# Patient Record
Sex: MALE | Race: WHITE | Employment: OTHER | URBAN - METROPOLITAN AREA
[De-identification: names, ages, dates, MRNs, and addresses within clinical notes are randomized per-mention and may not be internally consistent; named-entity substitution may affect disease eponyms.]

---

## 2018-12-23 ENCOUNTER — APPOINTMENT (OUTPATIENT)
Dept: GENERAL RADIOLOGY | Age: 73
End: 2018-12-23
Payer: MEDICARE

## 2018-12-23 ENCOUNTER — APPOINTMENT (OUTPATIENT)
Dept: CT IMAGING | Age: 73
End: 2018-12-23
Payer: MEDICARE

## 2018-12-23 ENCOUNTER — HOSPITAL ENCOUNTER (OUTPATIENT)
Age: 73
Setting detail: OBSERVATION
Discharge: HOME | End: 2018-12-24
Attending: EMERGENCY MEDICINE | Admitting: INTERNAL MEDICINE
Payer: MEDICARE

## 2018-12-23 DIAGNOSIS — R41.82 ALTERED MENTAL STATUS, UNSPECIFIED ALTERED MENTAL STATUS TYPE: Primary | ICD-10-CM

## 2018-12-23 DIAGNOSIS — R47.01 APHASIA: ICD-10-CM

## 2018-12-23 PROBLEM — R47.02 DYSPHASIA: Status: ACTIVE | Noted: 2018-12-23

## 2018-12-23 PROBLEM — G45.9 TIA (TRANSIENT ISCHEMIC ATTACK): Status: ACTIVE | Noted: 2018-12-23

## 2018-12-23 LAB
A/G RATIO: 1.7 (ref 1.1–2.2)
ALBUMIN SERPL-MCNC: 4.3 G/DL (ref 3.4–5)
ALP BLD-CCNC: 70 U/L (ref 40–129)
ALT SERPL-CCNC: 16 U/L (ref 10–40)
ANION GAP SERPL CALCULATED.3IONS-SCNC: 10 MMOL/L (ref 3–16)
APTT: 30.7 SEC (ref 26–36)
AST SERPL-CCNC: 13 U/L (ref 15–37)
BASOPHILS ABSOLUTE: 0 K/UL (ref 0–0.2)
BASOPHILS RELATIVE PERCENT: 0.7 %
BILIRUB SERPL-MCNC: 0.4 MG/DL (ref 0–1)
BILIRUBIN URINE: NEGATIVE
BLOOD, URINE: NEGATIVE
BUN BLDV-MCNC: 25 MG/DL (ref 7–20)
CALCIUM SERPL-MCNC: 10.3 MG/DL (ref 8.3–10.6)
CHLORIDE BLD-SCNC: 104 MMOL/L (ref 99–110)
CLARITY: CLEAR
CO2: 29 MMOL/L (ref 21–32)
COLOR: YELLOW
CREAT SERPL-MCNC: 1.3 MG/DL (ref 0.8–1.3)
EOSINOPHILS ABSOLUTE: 0.1 K/UL (ref 0–0.6)
EOSINOPHILS RELATIVE PERCENT: 2 %
EPITHELIAL CELLS, UA: 0 /HPF (ref 0–5)
GFR AFRICAN AMERICAN: >60
GFR NON-AFRICAN AMERICAN: 54
GLOBULIN: 2.5 G/DL
GLUCOSE BLD-MCNC: 129 MG/DL (ref 70–99)
GLUCOSE URINE: NEGATIVE MG/DL
HCT VFR BLD CALC: 41.5 % (ref 40.5–52.5)
HEMOGLOBIN: 13.8 G/DL (ref 13.5–17.5)
HYALINE CASTS: 1 /LPF (ref 0–8)
INR BLD: 1.01 (ref 0.86–1.14)
KETONES, URINE: NEGATIVE MG/DL
LEUKOCYTE ESTERASE, URINE: NEGATIVE
LIPASE: 235 U/L (ref 13–60)
LYMPHOCYTES ABSOLUTE: 1.1 K/UL (ref 1–5.1)
LYMPHOCYTES RELATIVE PERCENT: 15.6 %
MCH RBC QN AUTO: 29.6 PG (ref 26–34)
MCHC RBC AUTO-ENTMCNC: 33.3 G/DL (ref 31–36)
MCV RBC AUTO: 88.9 FL (ref 80–100)
MICROSCOPIC EXAMINATION: YES
MONOCYTES ABSOLUTE: 0.8 K/UL (ref 0–1.3)
MONOCYTES RELATIVE PERCENT: 10.9 %
NEUTROPHILS ABSOLUTE: 5 K/UL (ref 1.7–7.7)
NEUTROPHILS RELATIVE PERCENT: 70.8 %
NITRITE, URINE: NEGATIVE
PDW BLD-RTO: 13.9 % (ref 12.4–15.4)
PH UA: 5.5
PLATELET # BLD: 190 K/UL (ref 135–450)
PMV BLD AUTO: 8.2 FL (ref 5–10.5)
POTASSIUM REFLEX MAGNESIUM: 4.2 MMOL/L (ref 3.5–5.1)
PRO-BNP: 480 PG/ML (ref 0–124)
PROTEIN UA: 100 MG/DL
PROTHROMBIN TIME: 11.5 SEC (ref 9.8–13)
RBC # BLD: 4.67 M/UL (ref 4.2–5.9)
RBC UA: 2 /HPF (ref 0–4)
SODIUM BLD-SCNC: 143 MMOL/L (ref 136–145)
SPECIFIC GRAVITY UA: >1.03
TOTAL PROTEIN: 6.8 G/DL (ref 6.4–8.2)
TROPONIN: <0.01 NG/ML
URINE TYPE: ABNORMAL
UROBILINOGEN, URINE: 0.2 E.U./DL
WBC # BLD: 7.1 K/UL (ref 4–11)
WBC UA: 1 /HPF (ref 0–5)

## 2018-12-23 PROCEDURE — 2580000003 HC RX 258: Performed by: EMERGENCY MEDICINE

## 2018-12-23 PROCEDURE — 96374 THER/PROPH/DIAG INJ IV PUSH: CPT

## 2018-12-23 PROCEDURE — 6360000002 HC RX W HCPCS: Performed by: EMERGENCY MEDICINE

## 2018-12-23 PROCEDURE — 70450 CT HEAD/BRAIN W/O DYE: CPT

## 2018-12-23 PROCEDURE — 70496 CT ANGIOGRAPHY HEAD: CPT

## 2018-12-23 PROCEDURE — 85025 COMPLETE CBC W/AUTO DIFF WBC: CPT

## 2018-12-23 PROCEDURE — 6360000004 HC RX CONTRAST MEDICATION: Performed by: EMERGENCY MEDICINE

## 2018-12-23 PROCEDURE — 85730 THROMBOPLASTIN TIME PARTIAL: CPT

## 2018-12-23 PROCEDURE — 83880 ASSAY OF NATRIURETIC PEPTIDE: CPT

## 2018-12-23 PROCEDURE — 85610 PROTHROMBIN TIME: CPT

## 2018-12-23 PROCEDURE — 6370000000 HC RX 637 (ALT 250 FOR IP): Performed by: EMERGENCY MEDICINE

## 2018-12-23 PROCEDURE — 99285 EMERGENCY DEPT VISIT HI MDM: CPT

## 2018-12-23 PROCEDURE — 81001 URINALYSIS AUTO W/SCOPE: CPT

## 2018-12-23 PROCEDURE — 96375 TX/PRO/DX INJ NEW DRUG ADDON: CPT

## 2018-12-23 PROCEDURE — G0378 HOSPITAL OBSERVATION PER HR: HCPCS

## 2018-12-23 PROCEDURE — 71045 X-RAY EXAM CHEST 1 VIEW: CPT

## 2018-12-23 PROCEDURE — 93005 ELECTROCARDIOGRAM TRACING: CPT | Performed by: EMERGENCY MEDICINE

## 2018-12-23 PROCEDURE — 80053 COMPREHEN METABOLIC PANEL: CPT

## 2018-12-23 PROCEDURE — 70498 CT ANGIOGRAPHY NECK: CPT

## 2018-12-23 PROCEDURE — 83690 ASSAY OF LIPASE: CPT

## 2018-12-23 PROCEDURE — 84484 ASSAY OF TROPONIN QUANT: CPT

## 2018-12-23 RX ORDER — ATORVASTATIN CALCIUM 80 MG/1
40 TABLET, FILM COATED ORAL NIGHTLY
Status: DISCONTINUED | OUTPATIENT
Start: 2018-12-23 | End: 2018-12-24 | Stop reason: HOSPADM

## 2018-12-23 RX ORDER — MORPHINE SULFATE 2 MG/ML
2 INJECTION, SOLUTION INTRAMUSCULAR; INTRAVENOUS
Status: DISCONTINUED | OUTPATIENT
Start: 2018-12-23 | End: 2018-12-24 | Stop reason: HOSPADM

## 2018-12-23 RX ORDER — PROMETHAZINE HYDROCHLORIDE 25 MG/ML
6.25 INJECTION, SOLUTION INTRAMUSCULAR; INTRAVENOUS ONCE
Status: COMPLETED | OUTPATIENT
Start: 2018-12-23 | End: 2018-12-23

## 2018-12-23 RX ORDER — ACETAMINOPHEN 325 MG/1
650 TABLET ORAL EVERY 4 HOURS PRN
Status: DISCONTINUED | OUTPATIENT
Start: 2018-12-23 | End: 2018-12-24 | Stop reason: HOSPADM

## 2018-12-23 RX ORDER — ASPIRIN 81 MG/1
81 TABLET, CHEWABLE ORAL DAILY
Status: DISCONTINUED | OUTPATIENT
Start: 2018-12-24 | End: 2018-12-24 | Stop reason: HOSPADM

## 2018-12-23 RX ORDER — SODIUM CHLORIDE 9 MG/ML
INJECTION, SOLUTION INTRAVENOUS CONTINUOUS
Status: DISCONTINUED | OUTPATIENT
Start: 2018-12-23 | End: 2018-12-24 | Stop reason: HOSPADM

## 2018-12-23 RX ORDER — ASPIRIN 81 MG/1
324 TABLET, CHEWABLE ORAL ONCE
Status: COMPLETED | OUTPATIENT
Start: 2018-12-23 | End: 2018-12-23

## 2018-12-23 RX ORDER — MORPHINE SULFATE 4 MG/ML
4 INJECTION, SOLUTION INTRAMUSCULAR; INTRAVENOUS
Status: DISCONTINUED | OUTPATIENT
Start: 2018-12-23 | End: 2018-12-24 | Stop reason: HOSPADM

## 2018-12-23 RX ORDER — ACETAMINOPHEN 325 MG/1
650 TABLET ORAL EVERY 4 HOURS PRN
Status: DISCONTINUED | OUTPATIENT
Start: 2018-12-23 | End: 2018-12-23 | Stop reason: SDUPTHER

## 2018-12-23 RX ORDER — ONDANSETRON 2 MG/ML
4 INJECTION INTRAMUSCULAR; INTRAVENOUS ONCE
Status: COMPLETED | OUTPATIENT
Start: 2018-12-23 | End: 2018-12-23

## 2018-12-23 RX ADMIN — SODIUM CHLORIDE: 9 INJECTION, SOLUTION INTRAVENOUS at 19:23

## 2018-12-23 RX ADMIN — PROMETHAZINE HYDROCHLORIDE 6.25 MG: 25 INJECTION INTRAMUSCULAR; INTRAVENOUS at 23:00

## 2018-12-23 RX ADMIN — IOPAMIDOL 75 ML: 755 INJECTION, SOLUTION INTRAVENOUS at 19:56

## 2018-12-23 RX ADMIN — ONDANSETRON 4 MG: 2 INJECTION INTRAMUSCULAR; INTRAVENOUS at 21:59

## 2018-12-23 RX ADMIN — ASPIRIN 81 MG 324 MG: 81 TABLET ORAL at 19:23

## 2018-12-23 ASSESSMENT — PAIN DESCRIPTION - ORIENTATION: ORIENTATION: RIGHT

## 2018-12-23 ASSESSMENT — PAIN SCALES - GENERAL: PAINLEVEL_OUTOF10: 9

## 2018-12-23 ASSESSMENT — PAIN DESCRIPTION - PAIN TYPE: TYPE: ACUTE PAIN

## 2018-12-23 ASSESSMENT — PAIN DESCRIPTION - LOCATION: LOCATION: HEAD

## 2018-12-23 NOTE — ED PROVIDER NOTES
alert\" been called? ->Yes  Ordering Physician Provided Reason for Exam: aphasia  Acuity: Acute  Type of Exam: Initial; ORDERING SYSTEM PROVIDED HISTORY: stroke  TECHNOLOGIST PROVIDED HISTORY:  Has a \"code stroke\" or \"stroke alert\" been called? ->Yes  Ordering Physician Provided Reason for Exam: aphasia  Acuity: Acute  Type of Exam: Initial    FINDINGS:    CTA NECK:    AORTIC ARCH/ARCH VESSELS: Vascular calcifications are noted.  Great vessel  origins are patent. CAROTID ARTERIES: Left: Common carotid artery is widely patent.  Bifurcation  and internal carotid artery are patent.  There tortuosity of the distal  cervical segment of the left internal carotid artery. Right: Common carotid artery is widely patent.  Bifurcation is widely patent. Right internal carotid artery is widely patent. Small calcifications are noted bilaterally. VERTEBRAL ARTERIES: Left vertebral artery arises from the arch which is a  normal variant.  Both vertebral arteries are patent without focal significant  narrowing. SOFT TISSUES: Large left submandibular gland calcifications are noted. Bilateral tonsillar calcifications are noted. Multilevel disc bulging in the cervical spine is noted. BONES: Degenerative changes in the cervical spine are noted. CTA HEAD:    ANTERIOR CIRCULATION: Anterior cerebral arteries are widely patent.  Middle  cerebral arteries are patent bilaterally.  There is no focal significant  narrowing.  There is no discrete aneurysm. POSTERIOR CIRCULATION: Calcifications are noted along the margin of the  distal left vertebral artery.  There is no significant associated narrowing. Both distal vertebral arteries and the basilar artery are patent.  There is  no focal narrowing.  Posterior cerebral arteries are widely patent.     BRAIN: Brain parenchymal detail is limited. Barbette Brent is no large area of  abnormal density.  There is no midline shift                    CTA HEAD W CONTRAST (Final result) Result time 12/23/18 20:58:52   Final result by Koby Dumont MD (12/23/18 20:58:52)                Impression:    No focal significant arterial narrowing in the head or the neck. Large left submandibular gland calcifications. Narrative:    EXAMINATION:  CTA OF THE HEAD WITH CONTRAST; CTA OF THE NECK 12/23/2018 7:56 pm; 12/23/2018  7:58 pm:    TECHNIQUE:  CTA of the head/brain was performed with the administration of intravenous  contrast. Multiplanar reformatted images are provided for review.  MIP images  are provided for review. Dose modulation, iterative reconstruction, and/or  weight based adjustment of the mA/kV was utilized to reduce the radiation  dose to as low as reasonably achievable.; CTA of the neck was performed with  the administration of intravenous contrast. Multiplanar reformatted images  are provided for review.  MIP images are provided for review. Stenosis of the  internal carotid arteries measured using NASCET criteria. Dose modulation,  iterative reconstruction, and/or weight based adjustment of the mA/kV was  utilized to reduce the radiation dose to as low as reasonably achievable. COMPARISON:  None. HISTORY:  ORDERING SYSTEM PROVIDED HISTORY: aphasia  TECHNOLOGIST PROVIDED HISTORY:  Has a \"code stroke\" or \"stroke alert\" been called? ->Yes  Ordering Physician Provided Reason for Exam: aphasia  Acuity: Acute  Type of Exam: Initial; ORDERING SYSTEM PROVIDED HISTORY: stroke  TECHNOLOGIST PROVIDED HISTORY:  Has a \"code stroke\" or \"stroke alert\" been called? ->Yes  Ordering Physician Provided Reason for Exam: aphasia  Acuity: Acute  Type of Exam: Initial    FINDINGS:    CTA NECK:    AORTIC ARCH/ARCH VESSELS: Vascular calcifications are noted.  Great vessel  origins are patent.     CAROTID ARTERIES: Left: Common carotid artery is widely patent.  Bifurcation  and internal carotid artery are patent.  There tortuosity of the distal  cervical segment of the left internal carotid type    2. Aphasia          DISPOSITION/PLAN   DISPOSITION Decision To Admit 12/23/2018 09:07:01 PM      PATIENT REFERRED TO:  No follow-up provider specified.     DISCHARGE MEDICATIONS:  New Prescriptions    No medications on file          (Please note that portions of this note were completed with a voice recognition program.  Efforts weremade to edit the dictations but occasionally words are mis-transcribed.)    Danyell Lu III, DO (electronically signed)  Attending Emergency Physician          Mary Boothe III, DO  12/23/18 2113       Mary Boothe III, DO  12/23/18 2133

## 2018-12-24 ENCOUNTER — APPOINTMENT (OUTPATIENT)
Dept: MRI IMAGING | Age: 73
End: 2018-12-24
Payer: MEDICARE

## 2018-12-24 VITALS
HEART RATE: 52 BPM | TEMPERATURE: 98 F | SYSTOLIC BLOOD PRESSURE: 176 MMHG | RESPIRATION RATE: 17 BRPM | WEIGHT: 200.8 LBS | OXYGEN SATURATION: 99 % | DIASTOLIC BLOOD PRESSURE: 103 MMHG | HEIGHT: 71 IN | BODY MASS INDEX: 28.11 KG/M2

## 2018-12-24 PROBLEM — R51.9 HEADACHE: Status: ACTIVE | Noted: 2018-12-24

## 2018-12-24 LAB
CHOLESTEROL, TOTAL: 109 MG/DL (ref 0–199)
EKG ATRIAL RATE: 62 BPM
EKG DIAGNOSIS: NORMAL
EKG P-R INTERVAL: 172 MS
EKG Q-T INTERVAL: 382 MS
EKG QRS DURATION: 104 MS
EKG QTC CALCULATION (BAZETT): 387 MS
EKG R AXIS: -20 DEGREES
EKG T AXIS: 0 DEGREES
EKG VENTRICULAR RATE: 62 BPM
ESTIMATED AVERAGE GLUCOSE: 165.7 MG/DL
GLUCOSE BLD-MCNC: 144 MG/DL (ref 70–99)
GLUCOSE BLD-MCNC: 201 MG/DL (ref 70–99)
HBA1C MFR BLD: 7.4 %
HDLC SERPL-MCNC: 45 MG/DL (ref 40–60)
LDL CHOLESTEROL CALCULATED: 48 MG/DL
LEFT VENTRICULAR EJECTION FRACTION HIGH VALUE: 50 %
LEFT VENTRICULAR EJECTION FRACTION MODE: NORMAL
LV EF: 50 %
LVEF MODALITY: NORMAL
PERFORMED ON: ABNORMAL
PERFORMED ON: ABNORMAL
TRIGL SERPL-MCNC: 82 MG/DL (ref 0–150)
VLDLC SERPL CALC-MCNC: 16 MG/DL

## 2018-12-24 PROCEDURE — 6370000000 HC RX 637 (ALT 250 FOR IP): Performed by: INTERNAL MEDICINE

## 2018-12-24 PROCEDURE — 80061 LIPID PANEL: CPT

## 2018-12-24 PROCEDURE — 70551 MRI BRAIN STEM W/O DYE: CPT

## 2018-12-24 PROCEDURE — 93010 ELECTROCARDIOGRAM REPORT: CPT | Performed by: INTERNAL MEDICINE

## 2018-12-24 PROCEDURE — 83036 HEMOGLOBIN GLYCOSYLATED A1C: CPT

## 2018-12-24 PROCEDURE — 36415 COLL VENOUS BLD VENIPUNCTURE: CPT

## 2018-12-24 PROCEDURE — G8997 SWALLOW GOAL STATUS: HCPCS

## 2018-12-24 PROCEDURE — 6370000000 HC RX 637 (ALT 250 FOR IP): Performed by: FAMILY MEDICINE

## 2018-12-24 PROCEDURE — G8987 SELF CARE CURRENT STATUS: HCPCS

## 2018-12-24 PROCEDURE — G0378 HOSPITAL OBSERVATION PER HR: HCPCS

## 2018-12-24 PROCEDURE — 93306 TTE W/DOPPLER COMPLETE: CPT

## 2018-12-24 PROCEDURE — G8996 SWALLOW CURRENT STATUS: HCPCS

## 2018-12-24 PROCEDURE — G8989 SELF CARE D/C STATUS: HCPCS

## 2018-12-24 PROCEDURE — 97165 OT EVAL LOW COMPLEX 30 MIN: CPT

## 2018-12-24 PROCEDURE — G8988 SELF CARE GOAL STATUS: HCPCS

## 2018-12-24 PROCEDURE — 92610 EVALUATE SWALLOWING FUNCTION: CPT

## 2018-12-24 PROCEDURE — 99220 PR INITIAL OBSERVATION CARE/DAY 70 MINUTES: CPT | Performed by: PSYCHIATRY & NEUROLOGY

## 2018-12-24 RX ORDER — INSULIN GLARGINE 100 [IU]/ML
43 INJECTION, SOLUTION SUBCUTANEOUS NIGHTLY
COMMUNITY

## 2018-12-24 RX ORDER — TACROLIMUS 1 MG/1
1 CAPSULE ORAL 2 TIMES DAILY
Status: DISCONTINUED | OUTPATIENT
Start: 2018-12-24 | End: 2018-12-24 | Stop reason: HOSPADM

## 2018-12-24 RX ORDER — HYDROCHLOROTHIAZIDE 25 MG/1
25 TABLET ORAL DAILY
COMMUNITY
End: 2019-12-23 | Stop reason: ALTCHOICE

## 2018-12-24 RX ORDER — OXYCODONE AND ACETAMINOPHEN 7.5; 325 MG/1; MG/1
1 TABLET ORAL EVERY 4 HOURS PRN
COMMUNITY
End: 2019-12-23 | Stop reason: ALTCHOICE

## 2018-12-24 RX ORDER — CHLORAL HYDRATE 500 MG
1200 CAPSULE ORAL DAILY
COMMUNITY

## 2018-12-24 RX ORDER — MYCOPHENOLATE MOFETIL 250 MG/1
750 CAPSULE ORAL 2 TIMES DAILY
COMMUNITY

## 2018-12-24 RX ORDER — TACROLIMUS 1 MG/1
1 CAPSULE ORAL 2 TIMES DAILY
COMMUNITY
End: 2019-12-23 | Stop reason: ALTCHOICE

## 2018-12-24 RX ORDER — HYDROCHLOROTHIAZIDE 25 MG/1
25 TABLET ORAL DAILY
Status: DISCONTINUED | OUTPATIENT
Start: 2018-12-24 | End: 2018-12-24 | Stop reason: HOSPADM

## 2018-12-24 RX ORDER — MYCOPHENOLATE MOFETIL 250 MG/1
750 CAPSULE ORAL 2 TIMES DAILY
Status: DISCONTINUED | OUTPATIENT
Start: 2018-12-24 | End: 2018-12-24 | Stop reason: HOSPADM

## 2018-12-24 RX ORDER — SIMVASTATIN 20 MG
10 TABLET ORAL NIGHTLY
Status: ON HOLD | COMMUNITY
End: 2019-12-25 | Stop reason: HOSPADM

## 2018-12-24 RX ORDER — ASPIRIN 81 MG/1
81 TABLET, CHEWABLE ORAL DAILY
COMMUNITY

## 2018-12-24 RX ADMIN — ASPIRIN 81 MG 81 MG: 81 TABLET ORAL at 10:56

## 2018-12-24 RX ADMIN — MYCOPHENOLATE MOFETIL 750 MG: 250 CAPSULE ORAL at 09:55

## 2018-12-24 RX ADMIN — HYDROCHLOROTHIAZIDE 25 MG: 25 TABLET ORAL at 13:10

## 2018-12-24 RX ADMIN — MYCOPHENOLATE MOFETIL 750 MG: 250 CAPSULE ORAL at 00:10

## 2018-12-24 RX ADMIN — TACROLIMUS 1 MG: 1 CAPSULE ORAL at 00:10

## 2018-12-24 RX ADMIN — ATORVASTATIN CALCIUM 40 MG: 80 TABLET, FILM COATED ORAL at 03:54

## 2018-12-24 RX ADMIN — TACROLIMUS 1 MG: 1 CAPSULE ORAL at 09:05

## 2018-12-24 ASSESSMENT — PAIN DESCRIPTION - LOCATION: LOCATION: HEAD

## 2018-12-24 ASSESSMENT — PAIN DESCRIPTION - ORIENTATION: ORIENTATION: RIGHT

## 2018-12-24 ASSESSMENT — PAIN SCALES - GENERAL
PAINLEVEL_OUTOF10: 0
PAINLEVEL_OUTOF10: 0
PAINLEVEL_OUTOF10: 2

## 2018-12-24 ASSESSMENT — PAIN DESCRIPTION - PAIN TYPE: TYPE: ACUTE PAIN

## 2018-12-24 NOTE — CONSULTS
In patient Neurology consult        [facility] Neurology      MD Alan Yarbrough  1945    Date of Service: 12/24/2018    Referring Physician: Esau Maddox MD      Reason for the consult: Acute encephalopathy and aphasia    HPI:   The patient is a 68y.o.  years old male with history of hypertension, diabetes, hyperlipidemia and status post renal transplant who was admitted to [facility] yesterday with acute encephalopathy. Symptoms started yesterday. Family describes sudden onset of confusion and difficulties with word findings. He could not remember names of his family or friends. He was not able to follow directions. Degree was severe. Duration was waxing and waning but at least several hours. No triggers. No other associated symptoms. No headache, chest pain, dysphagia, weakness or numbness of falling. Patient's family decided to bring him to the ED. Symptoms improved gradually. He was admitted. Initial CT of the head showed no acute findings. CTA of the head and neck showed no severe ICA stenosis. The patient today feels back to his baseline. MRI of the brain reviewed and showed chronic small vessel disease. He is currently on aspirin and statin. He denies any prior history of strokes. No recent change in medications. Other review of system was unremarkable.           Past Medical History:   Diagnosis Date    Arthritis     Atrial fibrillation ([address]     Cancer ([address]     Diabetes mellitus ([address]     History of blood transfusion     Hyperlipidemia     Hypertension      Family History   Problem Relation Age of Onset    Arthritis Mother     Arrhythmia Mother     Diabetes Mother     High Blood Pressure Mother     Obesity Mother     High Blood Pressure Father     Obesity Father     Allergy (Severe) Child     Asthma Child     Obesity Child      Past Surgical History:   Procedure Laterality Date    EYE SURGERY      JOINT REPLACEMENT       Past Surgical Constitutional:   Vitals:    12/23/18 2345 12/24/18 0330 12/24/18 0700 12/24/18 0805   BP: (!) 177/72 (!) 169/79  (!) 150/76   Pulse: 79 74  63   Resp: 16 16  17   Temp: 98.2 °F (36.8 °C) 98.9 °F (37.2 °C)  98 °F (36.7 °C)   TempSrc: Oral Oral  Axillary   SpO2: 94% 95%  98%   Weight: 197 lb 6.4 oz (89.5 kg)  200 lb 12.8 oz (91.1 kg)    Height:           General appearance:  Normal development and appear in no acute distress. Eye: No icterus. Fundus: No blurring of optic disc. Neck: supple  Cardiovascular: No carotid bruit. No lower leg edema with good pulsation. Mental Status:   Oriented to person, place, problem, and time. Memory: Aware of recent and remote event. Good immediate recall. Intact remote memory  Normal attention span and concentration. Language: intact naming, repeating and fluency   Good fund of Knowledge. Aware of current events and vocabulary   Cranial Nerves:   II: Visual fields: Full to confrontation and nl VA. Pupils: equal, round, reactive to light  III,IV,VI: Extra Ocular Movements are intact. No nystagmus  V: Facial sensation is intact to pin prick and light touch  VII: Facial strength and movements: intact and symmetric  VIII: Hearing: Intact to finger rub bilaterally  IX: Palate elevation is symmetric  XI: Shoulder shrug is intact  XII: Tongue movements are normal  Musculoskeletal: 5/5 in all 4 extremities. Tone: Normal tone. Reflexes: Bilateral biceps 2/4, triceps 2/4, brachial radialis 2/4, knee 1/4 and ankle 1/4. Planters: flexor bilaterally. Coordination: no pronator drift, no dysmetria with FNF and HST in upper and lower extremities. Normal REM. Sensation: normal to all modalities in both arms and legs. Gait/Posture: steady gait and normal posturing and station.      Data:  LABS:   Lab Results   Component Value Date     12/23/2018    K 4.2 12/23/2018     12/23/2018    CO2 29 12/23/2018    BUN 25 12/23/2018    CREATININE 1.3 12/23/2018

## 2018-12-24 NOTE — H&P
does not drink alcohol. Family History:  Reviewed in detail and negative for DM, Early CAD, Cancer, CVA. Positive as follows:    History reviewed. No pertinent family history. REVIEW OF SYSTEMS:   Positive for left-sided headache with associated nausea or vomiting, transient confusion and dysphasia and as noted in the HPI. All other systems reviewed and negative. PHYSICAL EXAM:    BP (!) 190/86   Pulse 73   Temp 97.7 °F (36.5 °C) (Oral)   Resp 11   Ht 5' 11\" (1.803 m)   Wt 204 lb (92.5 kg)   SpO2 96%   BMI 28.45 kg/m²     General appearance: No apparent distress appears stated age and cooperative. HEENT Normal cephalic, atraumatic without obvious deformity. Pupils equal, round, and reactive to light. Extra ocular muscles intact. Conjunctivae/corneas clear. Neck: Supple, No jugular venous distention/bruits. Trachea midline without thyromegaly or adenopathy with full range of motion. Lungs: Clear to auscultation, bilaterally without Rales/Wheezes/Rhonchi with good respiratory effort. Heart: Regular rate and rhythm with Normal S1/S2 without murmurs, rubs or gallops, point of maximum impulse non-displaced  Abdomen: Soft, non-tender or non-distended without rigidity or guarding and positive bowel sounds all four quadrants. Extremities: No clubbing, cyanosis, or edema bilaterally. Full range of motion without deformity and normal gait intact. Skin: Skin color, texture, turgor normal.  No rashes or lesions. Neurologic: Alert and oriented X 3, neurovascularly intact with sensory/motor intact upper extremities/lower extremities, bilaterally. Cranial nerves: II-XII intact, grossly non-focal.  Mental status: Alert, oriented, thought content appropriate.     CXR:  I have reviewed the CXR with the following interpretation: clear lung field  EKG:  I have reviewed the EKG with the following interpretation: Sinus rhythm with first-degree heart block  CT brain: No acute intracranial pathology  CTA head and neck: No acute pathology    CBC   Recent Labs      12/23/18 1859   WBC  7.1   HGB  13.8   HCT  41.5   PLT  190      RENAL  Recent Labs      12/23/18 1859   NA  143   K  4.2   CL  104   CO2  29   BUN  25*   CREATININE  1.3     LFT'S  Recent Labs      12/23/18 1859   AST  13*   ALT  16   BILITOT  0.4   ALKPHOS  70     COAG  Recent Labs      12/23/18 1859   INR  1.01     CARDIAC ENZYMES  Recent Labs      12/23/18 1859   TROPONINI  <0.01       U/A:    Lab Results   Component Value Date    COLORU YELLOW 12/23/2018    WBCUA 1 12/23/2018    RBCUA 2 12/23/2018    CLARITYU Clear 12/23/2018    SPECGRAV >1.030 12/23/2018    LEUKOCYTESUR Negative 12/23/2018    BLOODU Negative 12/23/2018    GLUCOSEU Negative 12/23/2018           Active Hospital Problems    Diagnosis Date Noted    Headache [R51] 12/24/2018    TIA (transient ischemic attack) [G45.9] 12/23/2018    Dysphasia [R47.02] 12/23/2018         ASSESSMENT/PLAN:  80-year-old gentleman with history of Renal transplant on anti-rejection medication, probable atrial fibrillation  though patient denies prior diagnosis who was admitted following transient neurological symptoms of word finding difficulty with associated left frontal headache concerning for probable TIA. Differentials include subclinical seizure. Plan:  -Get MRI scan of the brain  -Echocardiogram  -PT OT and speech pathology consult  -Neurology consult  -Continue aspirin and will add Lipitor  -Check A1c and lipid panel      DVT Prophylaxis: SQ enoxaparin  Diet:  Regular  Code Status: Full  PT/OT Eval Status: get evals    Dispo - anticipate discharge in next 24-48 hours if tests unremarkable       Janell Callaway MD    Thank you No primary care provider on file. for the opportunity to be involved in this patient's care. If you have any questions or concerns please feel free to contact me at [phone]. A total of evaluation in the ER the patient was asymptomatic except for the headache.

## 2018-12-24 NOTE — PLAN OF CARE
Problem: Cardiovascular  Goal: No DVT, peripheral vascular complications  periph pulses present; extremities warm to touch; cap refill brisk; negative Homans

## 2018-12-24 NOTE — PLAN OF CARE
Problem: Cardiovascular  Goal: Hemodynamic stability  BP mildly elevated - AM meds yet to be administered

## 2019-12-23 ENCOUNTER — APPOINTMENT (OUTPATIENT)
Dept: GENERAL RADIOLOGY | Age: 74
DRG: 062 | End: 2019-12-23
Payer: OTHER GOVERNMENT

## 2019-12-23 ENCOUNTER — APPOINTMENT (OUTPATIENT)
Dept: CT IMAGING | Age: 74
DRG: 062 | End: 2019-12-23
Payer: OTHER GOVERNMENT

## 2019-12-23 ENCOUNTER — HOSPITAL ENCOUNTER (INPATIENT)
Age: 74
LOS: 2 days | Discharge: HOME | DRG: 062 | End: 2019-12-25
Attending: EMERGENCY MEDICINE | Admitting: INTERNAL MEDICINE
Payer: OTHER GOVERNMENT

## 2019-12-23 DIAGNOSIS — R47.01 APHASIA: ICD-10-CM

## 2019-12-23 DIAGNOSIS — I10 MALIGNANT HYPERTENSION: ICD-10-CM

## 2019-12-23 DIAGNOSIS — I63.9 CEREBROVASCULAR ACCIDENT (CVA), UNSPECIFIED MECHANISM (HCC): Primary | ICD-10-CM

## 2019-12-23 LAB
A/G RATIO: 1 (ref 1.1–2.2)
ALBUMIN SERPL-MCNC: 2.8 G/DL (ref 3.4–5)
ALP BLD-CCNC: 74 U/L (ref 40–129)
ALT SERPL-CCNC: 16 U/L (ref 10–40)
ANION GAP SERPL CALCULATED.3IONS-SCNC: 11 MMOL/L (ref 3–16)
AST SERPL-CCNC: 15 U/L (ref 15–37)
BASOPHILS ABSOLUTE: 0.1 K/UL (ref 0–0.2)
BASOPHILS RELATIVE PERCENT: 1 %
BILIRUB SERPL-MCNC: <0.2 MG/DL (ref 0–1)
BILIRUBIN URINE: ABNORMAL
BLOOD, URINE: ABNORMAL
BUN BLDV-MCNC: 17 MG/DL (ref 7–20)
CALCIUM SERPL-MCNC: 8.6 MG/DL (ref 8.3–10.6)
CASTS: ABNORMAL /LPF
CHLORIDE BLD-SCNC: 103 MMOL/L (ref 99–110)
CLARITY: ABNORMAL
CO2: 25 MMOL/L (ref 21–32)
COLOR: YELLOW
COMMENT UA: ABNORMAL
CREAT SERPL-MCNC: 1.5 MG/DL (ref 0.8–1.3)
EOSINOPHILS ABSOLUTE: 0.1 K/UL (ref 0–0.6)
EOSINOPHILS RELATIVE PERCENT: 0.9 %
EPITHELIAL CELLS, UA: 5 /HPF (ref 0–5)
GFR AFRICAN AMERICAN: 55
GFR NON-AFRICAN AMERICAN: 46
GLOBULIN: 2.9 G/DL
GLUCOSE BLD-MCNC: 186 MG/DL (ref 70–99)
GLUCOSE BLD-MCNC: 192 MG/DL (ref 70–99)
GLUCOSE BLD-MCNC: 224 MG/DL (ref 70–99)
GLUCOSE URINE: 250 MG/DL
HCT VFR BLD CALC: 40.6 % (ref 40.5–52.5)
HEMOGLOBIN: 13.5 G/DL (ref 13.5–17.5)
HYALINE CASTS: 23 /LPF (ref 0–8)
INR BLD: 0.97 (ref 0.86–1.14)
KETONES, URINE: NEGATIVE MG/DL
LEUKOCYTE ESTERASE, URINE: NEGATIVE
LYMPHOCYTES ABSOLUTE: 0.8 K/UL (ref 1–5.1)
LYMPHOCYTES RELATIVE PERCENT: 10.4 %
MCH RBC QN AUTO: 28.3 PG (ref 26–34)
MCHC RBC AUTO-ENTMCNC: 33.3 G/DL (ref 31–36)
MCV RBC AUTO: 85 FL (ref 80–100)
MICROSCOPIC EXAMINATION: YES
MONOCYTES ABSOLUTE: 0.7 K/UL (ref 0–1.3)
MONOCYTES RELATIVE PERCENT: 9.6 %
NEUTROPHILS ABSOLUTE: 5.8 K/UL (ref 1.7–7.7)
NEUTROPHILS RELATIVE PERCENT: 78.1 %
NITRITE, URINE: NEGATIVE
PDW BLD-RTO: 15.2 % (ref 12.4–15.4)
PERFORMED ON: ABNORMAL
PERFORMED ON: ABNORMAL
PH UA: 6 (ref 5–8)
PLATELET # BLD: 207 K/UL (ref 135–450)
PMV BLD AUTO: 7.9 FL (ref 5–10.5)
POTASSIUM REFLEX MAGNESIUM: 3.6 MMOL/L (ref 3.5–5.1)
PRO-BNP: 1253 PG/ML (ref 0–124)
PROTEIN UA: >300 MG/DL
PROTHROMBIN TIME: 11.2 SEC (ref 10–13.2)
RBC # BLD: 4.78 M/UL (ref 4.2–5.9)
RBC UA: 22 /HPF (ref 0–4)
SODIUM BLD-SCNC: 139 MMOL/L (ref 136–145)
SPECIFIC GRAVITY UA: >1.03 (ref 1–1.03)
TOTAL PROTEIN: 5.7 G/DL (ref 6.4–8.2)
TROPONIN: 0.02 NG/ML
URINE REFLEX TO CULTURE: ABNORMAL
URINE TYPE: ABNORMAL
UROBILINOGEN, URINE: 0.2 E.U./DL
WBC # BLD: 7.5 K/UL (ref 4–11)
WBC UA: 4 /HPF (ref 0–5)

## 2019-12-23 PROCEDURE — 80053 COMPREHEN METABOLIC PANEL: CPT

## 2019-12-23 PROCEDURE — 99291 CRITICAL CARE FIRST HOUR: CPT

## 2019-12-23 PROCEDURE — 70450 CT HEAD/BRAIN W/O DYE: CPT

## 2019-12-23 PROCEDURE — 2580000003 HC RX 258: Performed by: EMERGENCY MEDICINE

## 2019-12-23 PROCEDURE — 6370000000 HC RX 637 (ALT 250 FOR IP): Performed by: INTERNAL MEDICINE

## 2019-12-23 PROCEDURE — 85610 PROTHROMBIN TIME: CPT

## 2019-12-23 PROCEDURE — 84484 ASSAY OF TROPONIN QUANT: CPT

## 2019-12-23 PROCEDURE — 99292 CRITICAL CARE ADDL 30 MIN: CPT

## 2019-12-23 PROCEDURE — 96376 TX/PRO/DX INJ SAME DRUG ADON: CPT

## 2019-12-23 PROCEDURE — 2500000003 HC RX 250 WO HCPCS: Performed by: EMERGENCY MEDICINE

## 2019-12-23 PROCEDURE — 71045 X-RAY EXAM CHEST 1 VIEW: CPT

## 2019-12-23 PROCEDURE — 6360000002 HC RX W HCPCS: Performed by: INTERNAL MEDICINE

## 2019-12-23 PROCEDURE — 96374 THER/PROPH/DIAG INJ IV PUSH: CPT

## 2019-12-23 PROCEDURE — 93005 ELECTROCARDIOGRAM TRACING: CPT | Performed by: EMERGENCY MEDICINE

## 2019-12-23 PROCEDURE — 3E03317 INTRODUCTION OF OTHER THROMBOLYTIC INTO PERIPHERAL VEIN, PERCUTANEOUS APPROACH: ICD-10-PCS | Performed by: INTERNAL MEDICINE

## 2019-12-23 PROCEDURE — 2000000000 HC ICU R&B

## 2019-12-23 PROCEDURE — 96375 TX/PRO/DX INJ NEW DRUG ADDON: CPT

## 2019-12-23 PROCEDURE — 93005 ELECTROCARDIOGRAM TRACING: CPT

## 2019-12-23 PROCEDURE — 36415 COLL VENOUS BLD VENIPUNCTURE: CPT

## 2019-12-23 PROCEDURE — 96361 HYDRATE IV INFUSION ADD-ON: CPT

## 2019-12-23 PROCEDURE — 81001 URINALYSIS AUTO W/SCOPE: CPT

## 2019-12-23 PROCEDURE — 2580000003 HC RX 258: Performed by: INTERNAL MEDICINE

## 2019-12-23 PROCEDURE — 83880 ASSAY OF NATRIURETIC PEPTIDE: CPT

## 2019-12-23 PROCEDURE — 6360000002 HC RX W HCPCS: Performed by: EMERGENCY MEDICINE

## 2019-12-23 PROCEDURE — 85025 COMPLETE CBC W/AUTO DIFF WBC: CPT

## 2019-12-23 RX ORDER — LOSARTAN POTASSIUM 100 MG/1
100 TABLET ORAL DAILY
COMMUNITY

## 2019-12-23 RX ORDER — LABETALOL HYDROCHLORIDE 5 MG/ML
10 INJECTION, SOLUTION INTRAVENOUS ONCE
Status: COMPLETED | OUTPATIENT
Start: 2019-12-23 | End: 2019-12-23

## 2019-12-23 RX ORDER — SIROLIMUS 1 MG/1
1 TABLET, FILM COATED ORAL DAILY
Status: DISCONTINUED | OUTPATIENT
Start: 2019-12-24 | End: 2019-12-23 | Stop reason: ALTCHOICE

## 2019-12-23 RX ORDER — NICOTINE POLACRILEX 4 MG
15 LOZENGE BUCCAL PRN
Status: DISCONTINUED | OUTPATIENT
Start: 2019-12-23 | End: 2019-12-25 | Stop reason: HOSPADM

## 2019-12-23 RX ORDER — MYCOPHENOLATE MOFETIL 250 MG/1
750 CAPSULE ORAL 2 TIMES DAILY
Status: DISCONTINUED | OUTPATIENT
Start: 2019-12-23 | End: 2019-12-24

## 2019-12-23 RX ORDER — TACROLIMUS 0.5 MG/1
0.5 CAPSULE ORAL 2 TIMES DAILY
Status: DISCONTINUED | OUTPATIENT
Start: 2019-12-23 | End: 2019-12-24

## 2019-12-23 RX ORDER — SODIUM CHLORIDE 0.9 % (FLUSH) 0.9 %
10 SYRINGE (ML) INJECTION EVERY 12 HOURS SCHEDULED
Status: DISCONTINUED | OUTPATIENT
Start: 2019-12-23 | End: 2019-12-25 | Stop reason: HOSPADM

## 2019-12-23 RX ORDER — 0.9 % SODIUM CHLORIDE 0.9 %
50 INTRAVENOUS SOLUTION INTRAVENOUS ONCE
Status: COMPLETED | OUTPATIENT
Start: 2019-12-23 | End: 2019-12-23

## 2019-12-23 RX ORDER — ATORVASTATIN CALCIUM 40 MG/1
40 TABLET, FILM COATED ORAL NIGHTLY
Status: DISCONTINUED | OUTPATIENT
Start: 2019-12-23 | End: 2019-12-25 | Stop reason: HOSPADM

## 2019-12-23 RX ORDER — SODIUM CHLORIDE 0.9 % (FLUSH) 0.9 %
10 SYRINGE (ML) INJECTION PRN
Status: DISCONTINUED | OUTPATIENT
Start: 2019-12-23 | End: 2019-12-25 | Stop reason: HOSPADM

## 2019-12-23 RX ORDER — SODIUM CHLORIDE 9 MG/ML
INJECTION, SOLUTION INTRAVENOUS
Status: DISPENSED
Start: 2019-12-23 | End: 2019-12-24

## 2019-12-23 RX ORDER — TACROLIMUS 0.5 MG/1
0.5 CAPSULE ORAL 2 TIMES DAILY
Status: DISCONTINUED | OUTPATIENT
Start: 2019-12-23 | End: 2019-12-23 | Stop reason: ALTCHOICE

## 2019-12-23 RX ORDER — INSULIN LISPRO 100 [IU]/ML
0-3 INJECTION, SOLUTION INTRAVENOUS; SUBCUTANEOUS NIGHTLY
Status: DISCONTINUED | OUTPATIENT
Start: 2019-12-23 | End: 2019-12-25 | Stop reason: HOSPADM

## 2019-12-23 RX ORDER — LEVOTHYROXINE SODIUM ANHYDROUS 100 UG/5ML
50 INJECTION, POWDER, LYOPHILIZED, FOR SOLUTION INTRAVENOUS DAILY
Status: ON HOLD | COMMUNITY
End: 2020-01-23

## 2019-12-23 RX ORDER — ACETAMINOPHEN 325 MG/1
650 TABLET ORAL EVERY 4 HOURS PRN
Status: DISCONTINUED | OUTPATIENT
Start: 2019-12-23 | End: 2019-12-25 | Stop reason: HOSPADM

## 2019-12-23 RX ORDER — DEXTROSE MONOHYDRATE 25 G/50ML
12.5 INJECTION, SOLUTION INTRAVENOUS
Status: ACTIVE | OUTPATIENT
Start: 2019-12-23 | End: 2019-12-23

## 2019-12-23 RX ORDER — AMLODIPINE BESYLATE 10 MG/1
5 TABLET ORAL DAILY
COMMUNITY

## 2019-12-23 RX ORDER — DEXTROSE MONOHYDRATE 50 MG/ML
100 INJECTION, SOLUTION INTRAVENOUS PRN
Status: DISCONTINUED | OUTPATIENT
Start: 2019-12-23 | End: 2019-12-25 | Stop reason: HOSPADM

## 2019-12-23 RX ORDER — DEXTROSE MONOHYDRATE 25 G/50ML
12.5 INJECTION, SOLUTION INTRAVENOUS PRN
Status: DISCONTINUED | OUTPATIENT
Start: 2019-12-23 | End: 2019-12-25 | Stop reason: HOSPADM

## 2019-12-23 RX ORDER — INSULIN LISPRO 100 [IU]/ML
0-6 INJECTION, SOLUTION INTRAVENOUS; SUBCUTANEOUS
Status: DISCONTINUED | OUTPATIENT
Start: 2019-12-23 | End: 2019-12-25 | Stop reason: HOSPADM

## 2019-12-23 RX ORDER — SIROLIMUS 1 MG/1
1 TABLET, FILM COATED ORAL DAILY
COMMUNITY

## 2019-12-23 RX ORDER — INSULIN LISPRO 100 [IU]/ML
5 INJECTION, SOLUTION INTRAVENOUS; SUBCUTANEOUS
Status: DISCONTINUED | OUTPATIENT
Start: 2019-12-23 | End: 2019-12-25 | Stop reason: HOSPADM

## 2019-12-23 RX ORDER — ONDANSETRON 2 MG/ML
4 INJECTION INTRAMUSCULAR; INTRAVENOUS EVERY 6 HOURS PRN
Status: DISCONTINUED | OUTPATIENT
Start: 2019-12-23 | End: 2019-12-25 | Stop reason: HOSPADM

## 2019-12-23 RX ADMIN — INSULIN LISPRO 1 UNITS: 100 INJECTION, SOLUTION INTRAVENOUS; SUBCUTANEOUS at 21:49

## 2019-12-23 RX ADMIN — TACROLIMUS 0.5 MG: 0.5 CAPSULE ORAL at 22:07

## 2019-12-23 RX ADMIN — SODIUM CHLORIDE 5.5 MG/HR: 9 INJECTION, SOLUTION INTRAVENOUS at 23:35

## 2019-12-23 RX ADMIN — ALTEPLASE 8.2 MG: KIT at 16:35

## 2019-12-23 RX ADMIN — Medication 10 ML: at 22:12

## 2019-12-23 RX ADMIN — DESMOPRESSIN ACETATE 40 MG: 0.2 TABLET ORAL at 22:07

## 2019-12-23 RX ADMIN — MYCOPHENOLATE MOFETIL 750 MG: 250 CAPSULE ORAL at 22:07

## 2019-12-23 RX ADMIN — INSULIN GLARGINE 25 UNITS: 100 INJECTION, SOLUTION SUBCUTANEOUS at 21:48

## 2019-12-23 RX ADMIN — SODIUM CHLORIDE 50 ML: 9 INJECTION, SOLUTION INTRAVENOUS at 17:26

## 2019-12-23 RX ADMIN — SODIUM CHLORIDE 8.5 MG/HR: 9 INJECTION, SOLUTION INTRAVENOUS at 20:54

## 2019-12-23 RX ADMIN — SODIUM CHLORIDE 5 MG/HR: 9 INJECTION, SOLUTION INTRAVENOUS at 17:43

## 2019-12-23 RX ADMIN — LABETALOL HYDROCHLORIDE 10 MG: 5 INJECTION INTRAVENOUS at 16:31

## 2019-12-23 RX ADMIN — ALTEPLASE 73.5 MG: KIT at 16:37

## 2019-12-24 ENCOUNTER — APPOINTMENT (OUTPATIENT)
Dept: MRI IMAGING | Age: 74
DRG: 062 | End: 2019-12-24
Payer: OTHER GOVERNMENT

## 2019-12-24 ENCOUNTER — APPOINTMENT (OUTPATIENT)
Dept: CT IMAGING | Age: 74
DRG: 062 | End: 2019-12-24
Payer: OTHER GOVERNMENT

## 2019-12-24 LAB
ANION GAP SERPL CALCULATED.3IONS-SCNC: 11 MMOL/L (ref 3–16)
BUN BLDV-MCNC: 16 MG/DL (ref 7–20)
CALCIUM SERPL-MCNC: 8.2 MG/DL (ref 8.3–10.6)
CHLORIDE BLD-SCNC: 103 MMOL/L (ref 99–110)
CHOLESTEROL, TOTAL: 172 MG/DL (ref 0–199)
CO2: 24 MMOL/L (ref 21–32)
CREAT SERPL-MCNC: 1.5 MG/DL (ref 0.8–1.3)
EKG ATRIAL RATE: 66 BPM
EKG ATRIAL RATE: 72 BPM
EKG ATRIAL RATE: 82 BPM
EKG DIAGNOSIS: NORMAL
EKG P AXIS: 17 DEGREES
EKG P AXIS: 89 DEGREES
EKG P-R INTERVAL: 198 MS
EKG P-R INTERVAL: 202 MS
EKG P-R INTERVAL: 208 MS
EKG Q-T INTERVAL: 382 MS
EKG Q-T INTERVAL: 398 MS
EKG Q-T INTERVAL: 422 MS
EKG QRS DURATION: 108 MS
EKG QRS DURATION: 110 MS
EKG QRS DURATION: 114 MS
EKG QTC CALCULATION (BAZETT): 417 MS
EKG QTC CALCULATION (BAZETT): 446 MS
EKG QTC CALCULATION (BAZETT): 462 MS
EKG R AXIS: -23 DEGREES
EKG R AXIS: -24 DEGREES
EKG R AXIS: -29 DEGREES
EKG T AXIS: -41 DEGREES
EKG T AXIS: 11 DEGREES
EKG T AXIS: 30 DEGREES
EKG VENTRICULAR RATE: 66 BPM
EKG VENTRICULAR RATE: 72 BPM
EKG VENTRICULAR RATE: 82 BPM
GFR AFRICAN AMERICAN: 55
GFR NON-AFRICAN AMERICAN: 46
GLUCOSE BLD-MCNC: 110 MG/DL (ref 70–99)
GLUCOSE BLD-MCNC: 129 MG/DL (ref 70–99)
GLUCOSE BLD-MCNC: 141 MG/DL (ref 70–99)
GLUCOSE BLD-MCNC: 161 MG/DL (ref 70–99)
GLUCOSE BLD-MCNC: 168 MG/DL (ref 70–99)
HCT VFR BLD CALC: 39.4 % (ref 40.5–52.5)
HDLC SERPL-MCNC: 47 MG/DL (ref 40–60)
HEMOGLOBIN: 13.1 G/DL (ref 13.5–17.5)
LDL CHOLESTEROL CALCULATED: 82 MG/DL
LEFT VENTRICULAR EJECTION FRACTION HIGH VALUE: 65 %
LEFT VENTRICULAR EJECTION FRACTION MODE: NORMAL
LV EF: 60 %
LV EF: 63 %
LVEF MODALITY: NORMAL
MCH RBC QN AUTO: 28.1 PG (ref 26–34)
MCHC RBC AUTO-ENTMCNC: 33.2 G/DL (ref 31–36)
MCV RBC AUTO: 84.6 FL (ref 80–100)
PDW BLD-RTO: 14.8 % (ref 12.4–15.4)
PERFORMED ON: ABNORMAL
PLATELET # BLD: 215 K/UL (ref 135–450)
PMV BLD AUTO: 8 FL (ref 5–10.5)
POTASSIUM REFLEX MAGNESIUM: 3.6 MMOL/L (ref 3.5–5.1)
RBC # BLD: 4.66 M/UL (ref 4.2–5.9)
SODIUM BLD-SCNC: 138 MMOL/L (ref 136–145)
TRIGL SERPL-MCNC: 216 MG/DL (ref 0–150)
VLDLC SERPL CALC-MCNC: 43 MG/DL
WBC # BLD: 9.4 K/UL (ref 4–11)

## 2019-12-24 PROCEDURE — 70450 CT HEAD/BRAIN W/O DYE: CPT

## 2019-12-24 PROCEDURE — 6360000002 HC RX W HCPCS: Performed by: INTERNAL MEDICINE

## 2019-12-24 PROCEDURE — 6370000000 HC RX 637 (ALT 250 FOR IP): Performed by: INTERNAL MEDICINE

## 2019-12-24 PROCEDURE — 85027 COMPLETE CBC AUTOMATED: CPT

## 2019-12-24 PROCEDURE — 92610 EVALUATE SWALLOWING FUNCTION: CPT

## 2019-12-24 PROCEDURE — 2500000003 HC RX 250 WO HCPCS: Performed by: EMERGENCY MEDICINE

## 2019-12-24 PROCEDURE — 80048 BASIC METABOLIC PNL TOTAL CA: CPT

## 2019-12-24 PROCEDURE — 99223 1ST HOSP IP/OBS HIGH 75: CPT | Performed by: PSYCHIATRY & NEUROLOGY

## 2019-12-24 PROCEDURE — 2580000003 HC RX 258: Performed by: EMERGENCY MEDICINE

## 2019-12-24 PROCEDURE — 93010 ELECTROCARDIOGRAM REPORT: CPT | Performed by: INTERNAL MEDICINE

## 2019-12-24 PROCEDURE — 93306 TTE W/DOPPLER COMPLETE: CPT

## 2019-12-24 PROCEDURE — 70551 MRI BRAIN STEM W/O DYE: CPT

## 2019-12-24 PROCEDURE — 2580000003 HC RX 258: Performed by: INTERNAL MEDICINE

## 2019-12-24 PROCEDURE — 93880 EXTRACRANIAL BILAT STUDY: CPT

## 2019-12-24 PROCEDURE — 2000000000 HC ICU R&B

## 2019-12-24 PROCEDURE — 80061 LIPID PANEL: CPT

## 2019-12-24 PROCEDURE — 92526 ORAL FUNCTION THERAPY: CPT

## 2019-12-24 PROCEDURE — 6370000000 HC RX 637 (ALT 250 FOR IP): Performed by: HOSPITALIST

## 2019-12-24 PROCEDURE — 99222 1ST HOSP IP/OBS MODERATE 55: CPT | Performed by: INTERNAL MEDICINE

## 2019-12-24 PROCEDURE — 93005 ELECTROCARDIOGRAM TRACING: CPT | Performed by: INTERNAL MEDICINE

## 2019-12-24 PROCEDURE — 92523 SPEECH SOUND LANG COMPREHEN: CPT

## 2019-12-24 RX ORDER — LABETALOL HYDROCHLORIDE 5 MG/ML
10 INJECTION, SOLUTION INTRAVENOUS EVERY 6 HOURS PRN
Status: DISCONTINUED | OUTPATIENT
Start: 2019-12-24 | End: 2019-12-25 | Stop reason: HOSPADM

## 2019-12-24 RX ORDER — ASPIRIN 81 MG/1
81 TABLET ORAL DAILY
Status: DISCONTINUED | OUTPATIENT
Start: 2019-12-24 | End: 2019-12-25 | Stop reason: HOSPADM

## 2019-12-24 RX ORDER — TRAMADOL HYDROCHLORIDE 50 MG/1
100 TABLET ORAL EVERY 6 HOURS PRN
Status: DISCONTINUED | OUTPATIENT
Start: 2019-12-24 | End: 2019-12-25 | Stop reason: HOSPADM

## 2019-12-24 RX ORDER — TACROLIMUS 1 MG/1
1 CAPSULE ORAL 2 TIMES DAILY
Status: DISCONTINUED | OUTPATIENT
Start: 2019-12-24 | End: 2019-12-25 | Stop reason: HOSPADM

## 2019-12-24 RX ORDER — AMLODIPINE BESYLATE 5 MG/1
5 TABLET ORAL DAILY
Status: DISCONTINUED | OUTPATIENT
Start: 2019-12-24 | End: 2019-12-25 | Stop reason: HOSPADM

## 2019-12-24 RX ORDER — PROMETHAZINE HYDROCHLORIDE 25 MG/ML
12.5 INJECTION, SOLUTION INTRAMUSCULAR; INTRAVENOUS EVERY 6 HOURS PRN
Status: DISCONTINUED | OUTPATIENT
Start: 2019-12-24 | End: 2019-12-25 | Stop reason: HOSPADM

## 2019-12-24 RX ORDER — TRAMADOL HYDROCHLORIDE 50 MG/1
50 TABLET ORAL EVERY 6 HOURS PRN
Status: DISCONTINUED | OUTPATIENT
Start: 2019-12-24 | End: 2019-12-25 | Stop reason: HOSPADM

## 2019-12-24 RX ORDER — HYDRALAZINE HYDROCHLORIDE 20 MG/ML
10 INJECTION INTRAMUSCULAR; INTRAVENOUS EVERY 6 HOURS PRN
Status: DISCONTINUED | OUTPATIENT
Start: 2019-12-24 | End: 2019-12-25 | Stop reason: HOSPADM

## 2019-12-24 RX ORDER — MYCOPHENOLATE MOFETIL 250 MG/1
750 CAPSULE ORAL 2 TIMES DAILY
Status: DISCONTINUED | OUTPATIENT
Start: 2019-12-24 | End: 2019-12-25 | Stop reason: HOSPADM

## 2019-12-24 RX ADMIN — INSULIN GLARGINE 25 UNITS: 100 INJECTION, SOLUTION SUBCUTANEOUS at 21:14

## 2019-12-24 RX ADMIN — Medication 10 ML: at 21:11

## 2019-12-24 RX ADMIN — TACROLIMUS 1 MG: 1 CAPSULE ORAL at 21:11

## 2019-12-24 RX ADMIN — ONDANSETRON 4 MG: 2 INJECTION INTRAMUSCULAR; INTRAVENOUS at 02:42

## 2019-12-24 RX ADMIN — INSULIN LISPRO 1 UNITS: 100 INJECTION, SOLUTION INTRAVENOUS; SUBCUTANEOUS at 12:52

## 2019-12-24 RX ADMIN — INSULIN LISPRO 5 UNITS: 100 INJECTION, SOLUTION INTRAVENOUS; SUBCUTANEOUS at 12:53

## 2019-12-24 RX ADMIN — TACROLIMUS 1 MG: 1 CAPSULE ORAL at 09:36

## 2019-12-24 RX ADMIN — Medication 10 ML: at 13:03

## 2019-12-24 RX ADMIN — DESMOPRESSIN ACETATE 40 MG: 0.2 TABLET ORAL at 21:11

## 2019-12-24 RX ADMIN — MYCOPHENOLATE MOFETIL 750 MG: 250 CAPSULE ORAL at 21:11

## 2019-12-24 RX ADMIN — SODIUM CHLORIDE 5 MG/HR: 9 INJECTION, SOLUTION INTRAVENOUS at 07:30

## 2019-12-24 RX ADMIN — ASPIRIN 81 MG: 81 TABLET, COATED ORAL at 17:27

## 2019-12-24 RX ADMIN — MYCOPHENOLATE MOFETIL 750 MG: 250 CAPSULE ORAL at 09:36

## 2019-12-24 RX ADMIN — AMLODIPINE BESYLATE 5 MG: 5 TABLET ORAL at 17:27

## 2019-12-24 RX ADMIN — INSULIN LISPRO 5 UNITS: 100 INJECTION, SOLUTION INTRAVENOUS; SUBCUTANEOUS at 17:40

## 2019-12-25 VITALS
WEIGHT: 198.19 LBS | RESPIRATION RATE: 16 BRPM | HEART RATE: 61 BPM | HEIGHT: 72 IN | BODY MASS INDEX: 26.84 KG/M2 | DIASTOLIC BLOOD PRESSURE: 76 MMHG | SYSTOLIC BLOOD PRESSURE: 158 MMHG | OXYGEN SATURATION: 100 % | TEMPERATURE: 97.3 F

## 2019-12-25 LAB
GLUCOSE BLD-MCNC: 75 MG/DL (ref 70–99)
PERFORMED ON: NORMAL

## 2019-12-25 PROCEDURE — 2580000003 HC RX 258: Performed by: EMERGENCY MEDICINE

## 2019-12-25 PROCEDURE — 99233 SBSQ HOSP IP/OBS HIGH 50: CPT | Performed by: PSYCHIATRY & NEUROLOGY

## 2019-12-25 PROCEDURE — 2580000003 HC RX 258: Performed by: INTERNAL MEDICINE

## 2019-12-25 PROCEDURE — 97530 THERAPEUTIC ACTIVITIES: CPT

## 2019-12-25 PROCEDURE — 97165 OT EVAL LOW COMPLEX 30 MIN: CPT

## 2019-12-25 PROCEDURE — 6370000000 HC RX 637 (ALT 250 FOR IP): Performed by: INTERNAL MEDICINE

## 2019-12-25 PROCEDURE — 6370000000 HC RX 637 (ALT 250 FOR IP): Performed by: HOSPITALIST

## 2019-12-25 PROCEDURE — 97161 PT EVAL LOW COMPLEX 20 MIN: CPT

## 2019-12-25 RX ORDER — ATORVASTATIN CALCIUM 40 MG/1
40 TABLET, FILM COATED ORAL NIGHTLY
Qty: 30 TABLET | Refills: 3 | Status: ON HOLD | OUTPATIENT
Start: 2019-12-25 | End: 2020-01-23

## 2019-12-25 RX ORDER — CLOPIDOGREL BISULFATE 75 MG/1
75 TABLET ORAL DAILY
Qty: 30 TABLET | Refills: 3 | Status: SHIPPED | OUTPATIENT
Start: 2019-12-25

## 2019-12-25 RX ORDER — CLOPIDOGREL BISULFATE 75 MG/1
75 TABLET ORAL DAILY
Status: DISCONTINUED | OUTPATIENT
Start: 2019-12-25 | End: 2019-12-25 | Stop reason: HOSPADM

## 2019-12-25 RX ORDER — LOSARTAN POTASSIUM 100 MG/1
100 TABLET ORAL DAILY
Status: DISCONTINUED | OUTPATIENT
Start: 2019-12-25 | End: 2019-12-25 | Stop reason: HOSPADM

## 2019-12-25 RX ADMIN — TACROLIMUS 1 MG: 1 CAPSULE ORAL at 09:51

## 2019-12-25 RX ADMIN — LOSARTAN POTASSIUM 100 MG: 100 TABLET, FILM COATED ORAL at 10:07

## 2019-12-25 RX ADMIN — ASPIRIN 81 MG: 81 TABLET, COATED ORAL at 09:49

## 2019-12-25 RX ADMIN — CLOPIDOGREL 75 MG: 75 TABLET, FILM COATED ORAL at 10:07

## 2019-12-25 RX ADMIN — MYCOPHENOLATE MOFETIL 750 MG: 250 CAPSULE ORAL at 09:52

## 2019-12-25 RX ADMIN — Medication 10 ML: at 10:11

## 2019-12-25 RX ADMIN — INSULIN LISPRO 5 UNITS: 100 INJECTION, SOLUTION INTRAVENOUS; SUBCUTANEOUS at 09:58

## 2019-12-25 RX ADMIN — Medication 10 ML: at 10:12

## 2019-12-25 RX ADMIN — AMLODIPINE BESYLATE 5 MG: 5 TABLET ORAL at 09:49

## 2020-01-23 ENCOUNTER — APPOINTMENT (OUTPATIENT)
Dept: MRI IMAGING | Age: 75
DRG: 683 | End: 2020-01-23
Payer: OTHER GOVERNMENT

## 2020-01-23 ENCOUNTER — HOSPITAL ENCOUNTER (INPATIENT)
Age: 75
LOS: 1 days | Discharge: HOME | DRG: 683 | End: 2020-01-27
Attending: EMERGENCY MEDICINE | Admitting: HOSPITALIST
Payer: OTHER GOVERNMENT

## 2020-01-23 ENCOUNTER — APPOINTMENT (OUTPATIENT)
Dept: CT IMAGING | Age: 75
DRG: 683 | End: 2020-01-23
Payer: OTHER GOVERNMENT

## 2020-01-23 ENCOUNTER — APPOINTMENT (OUTPATIENT)
Dept: GENERAL RADIOLOGY | Age: 75
DRG: 683 | End: 2020-01-23
Payer: OTHER GOVERNMENT

## 2020-01-23 LAB
A/G RATIO: 1.3 (ref 1.1–2.2)
ALBUMIN SERPL-MCNC: 3.4 G/DL (ref 3.4–5)
ALP BLD-CCNC: 72 U/L (ref 40–129)
ALT SERPL-CCNC: 12 U/L (ref 10–40)
ANION GAP SERPL CALCULATED.3IONS-SCNC: 11 MMOL/L (ref 3–16)
AST SERPL-CCNC: 11 U/L (ref 15–37)
BASOPHILS ABSOLUTE: 0.1 K/UL (ref 0–0.2)
BASOPHILS RELATIVE PERCENT: 1.1 %
BILIRUB SERPL-MCNC: 0.7 MG/DL (ref 0–1)
BILIRUBIN URINE: NEGATIVE
BLOOD, URINE: ABNORMAL
BUN BLDV-MCNC: 20 MG/DL (ref 7–20)
CALCIUM SERPL-MCNC: 9.4 MG/DL (ref 8.3–10.6)
CHLORIDE BLD-SCNC: 98 MMOL/L (ref 99–110)
CLARITY: CLEAR
CO2: 27 MMOL/L (ref 21–32)
COLOR: YELLOW
CREAT SERPL-MCNC: 1.4 MG/DL (ref 0.8–1.3)
EOSINOPHILS ABSOLUTE: 0 K/UL (ref 0–0.6)
EOSINOPHILS RELATIVE PERCENT: 0.6 %
EPITHELIAL CELLS, UA: 2 /HPF (ref 0–5)
GFR AFRICAN AMERICAN: 60
GFR NON-AFRICAN AMERICAN: 50
GLOBULIN: 2.7 G/DL
GLUCOSE BLD-MCNC: 222 MG/DL (ref 70–99)
GLUCOSE BLD-MCNC: 243 MG/DL (ref 70–99)
GLUCOSE BLD-MCNC: 250 MG/DL (ref 70–99)
GLUCOSE URINE: 250 MG/DL
HCT VFR BLD CALC: 42.3 % (ref 40.5–52.5)
HEMOGLOBIN: 14.2 G/DL (ref 13.5–17.5)
HYALINE CASTS: 9 /LPF (ref 0–8)
INR BLD: 0.97 (ref 0.86–1.14)
KETONES, URINE: ABNORMAL MG/DL
LEUKOCYTE ESTERASE, URINE: NEGATIVE
LYMPHOCYTES ABSOLUTE: 0.8 K/UL (ref 1–5.1)
LYMPHOCYTES RELATIVE PERCENT: 10.4 %
MCH RBC QN AUTO: 28.7 PG (ref 26–34)
MCHC RBC AUTO-ENTMCNC: 33.5 G/DL (ref 31–36)
MCV RBC AUTO: 85.9 FL (ref 80–100)
MICROSCOPIC EXAMINATION: YES
MONOCYTES ABSOLUTE: 0.5 K/UL (ref 0–1.3)
MONOCYTES RELATIVE PERCENT: 6.6 %
NEUTROPHILS ABSOLUTE: 5.9 K/UL (ref 1.7–7.7)
NEUTROPHILS RELATIVE PERCENT: 81.3 %
NITRITE, URINE: NEGATIVE
PDW BLD-RTO: 15.8 % (ref 12.4–15.4)
PERFORMED ON: ABNORMAL
PERFORMED ON: ABNORMAL
PH UA: 7.5 (ref 5–8)
PLATELET # BLD: 196 K/UL (ref 135–450)
PMV BLD AUTO: 8.4 FL (ref 5–10.5)
POTASSIUM REFLEX MAGNESIUM: 4.3 MMOL/L (ref 3.5–5.1)
PROTEIN UA: >300 MG/DL
PROTHROMBIN TIME: 11.3 SEC (ref 10–13.2)
RBC # BLD: 4.93 M/UL (ref 4.2–5.9)
RBC UA: 15 /HPF (ref 0–4)
SODIUM BLD-SCNC: 136 MMOL/L (ref 136–145)
SPECIFIC GRAVITY UA: 1.02 (ref 1–1.03)
TOTAL PROTEIN: 6.1 G/DL (ref 6.4–8.2)
TROPONIN: 0.02 NG/ML
URINE REFLEX TO CULTURE: ABNORMAL
URINE TYPE: ABNORMAL
UROBILINOGEN, URINE: 0.2 E.U./DL
WBC # BLD: 7.3 K/UL (ref 4–11)
WBC UA: 2 /HPF (ref 0–5)

## 2020-01-23 PROCEDURE — 85025 COMPLETE CBC W/AUTO DIFF WBC: CPT

## 2020-01-23 PROCEDURE — 80053 COMPREHEN METABOLIC PANEL: CPT

## 2020-01-23 PROCEDURE — 96375 TX/PRO/DX INJ NEW DRUG ADDON: CPT

## 2020-01-23 PROCEDURE — 2580000003 HC RX 258: Performed by: HOSPITALIST

## 2020-01-23 PROCEDURE — 84484 ASSAY OF TROPONIN QUANT: CPT

## 2020-01-23 PROCEDURE — 70551 MRI BRAIN STEM W/O DYE: CPT

## 2020-01-23 PROCEDURE — 81001 URINALYSIS AUTO W/SCOPE: CPT

## 2020-01-23 PROCEDURE — 6360000002 HC RX W HCPCS: Performed by: HOSPITALIST

## 2020-01-23 PROCEDURE — 70450 CT HEAD/BRAIN W/O DYE: CPT

## 2020-01-23 PROCEDURE — 6370000000 HC RX 637 (ALT 250 FOR IP): Performed by: HOSPITALIST

## 2020-01-23 PROCEDURE — 6360000002 HC RX W HCPCS: Performed by: EMERGENCY MEDICINE

## 2020-01-23 PROCEDURE — 36415 COLL VENOUS BLD VENIPUNCTURE: CPT

## 2020-01-23 PROCEDURE — 96376 TX/PRO/DX INJ SAME DRUG ADON: CPT

## 2020-01-23 PROCEDURE — 85610 PROTHROMBIN TIME: CPT

## 2020-01-23 PROCEDURE — 94760 N-INVAS EAR/PLS OXIMETRY 1: CPT

## 2020-01-23 PROCEDURE — 96374 THER/PROPH/DIAG INJ IV PUSH: CPT

## 2020-01-23 PROCEDURE — G0378 HOSPITAL OBSERVATION PER HR: HCPCS

## 2020-01-23 PROCEDURE — 99291 CRITICAL CARE FIRST HOUR: CPT

## 2020-01-23 PROCEDURE — 93005 ELECTROCARDIOGRAM TRACING: CPT | Performed by: EMERGENCY MEDICINE

## 2020-01-23 PROCEDURE — 6360000002 HC RX W HCPCS

## 2020-01-23 PROCEDURE — C9113 INJ PANTOPRAZOLE SODIUM, VIA: HCPCS | Performed by: HOSPITALIST

## 2020-01-23 PROCEDURE — 71045 X-RAY EXAM CHEST 1 VIEW: CPT

## 2020-01-23 RX ORDER — INSULIN LISPRO 100 [IU]/ML
0-3 INJECTION, SOLUTION INTRAVENOUS; SUBCUTANEOUS NIGHTLY
Status: DISCONTINUED | OUTPATIENT
Start: 2020-01-23 | End: 2020-01-27 | Stop reason: HOSPADM

## 2020-01-23 RX ORDER — MYCOPHENOLATE MOFETIL 250 MG/1
750 CAPSULE ORAL 2 TIMES DAILY
Status: DISCONTINUED | OUTPATIENT
Start: 2020-01-23 | End: 2020-01-27 | Stop reason: HOSPADM

## 2020-01-23 RX ORDER — DEXTROSE MONOHYDRATE 25 G/50ML
12.5 INJECTION, SOLUTION INTRAVENOUS PRN
Status: DISCONTINUED | OUTPATIENT
Start: 2020-01-23 | End: 2020-01-27 | Stop reason: HOSPADM

## 2020-01-23 RX ORDER — LORAZEPAM 2 MG/ML
1 INJECTION INTRAMUSCULAR ONCE
Status: COMPLETED | OUTPATIENT
Start: 2020-01-23 | End: 2020-01-23

## 2020-01-23 RX ORDER — AMLODIPINE BESYLATE 5 MG/1
5 TABLET ORAL DAILY
Status: DISCONTINUED | OUTPATIENT
Start: 2020-01-23 | End: 2020-01-24

## 2020-01-23 RX ORDER — ATORVASTATIN CALCIUM 40 MG/1
40 TABLET, FILM COATED ORAL NIGHTLY
Status: DISCONTINUED | OUTPATIENT
Start: 2020-01-23 | End: 2020-01-27 | Stop reason: HOSPADM

## 2020-01-23 RX ORDER — MYCOPHENOLATE MOFETIL 250 MG/1
750 CAPSULE ORAL 2 TIMES DAILY
Status: DISCONTINUED | OUTPATIENT
Start: 2020-01-23 | End: 2020-01-23 | Stop reason: DRUGHIGH

## 2020-01-23 RX ORDER — LABETALOL HYDROCHLORIDE 5 MG/ML
10 INJECTION, SOLUTION INTRAVENOUS EVERY 6 HOURS PRN
Status: DISCONTINUED | OUTPATIENT
Start: 2020-01-23 | End: 2020-01-26

## 2020-01-23 RX ORDER — CLOPIDOGREL BISULFATE 75 MG/1
75 TABLET ORAL DAILY
Status: DISCONTINUED | OUTPATIENT
Start: 2020-01-24 | End: 2020-01-27 | Stop reason: HOSPADM

## 2020-01-23 RX ORDER — NICOTINE POLACRILEX 4 MG
15 LOZENGE BUCCAL PRN
Status: DISCONTINUED | OUTPATIENT
Start: 2020-01-23 | End: 2020-01-27 | Stop reason: HOSPADM

## 2020-01-23 RX ORDER — INSULIN LISPRO 100 [IU]/ML
0-6 INJECTION, SOLUTION INTRAVENOUS; SUBCUTANEOUS
Status: DISCONTINUED | OUTPATIENT
Start: 2020-01-24 | End: 2020-01-27 | Stop reason: HOSPADM

## 2020-01-23 RX ORDER — ONDANSETRON 2 MG/ML
4 INJECTION INTRAMUSCULAR; INTRAVENOUS EVERY 6 HOURS PRN
Status: DISCONTINUED | OUTPATIENT
Start: 2020-01-23 | End: 2020-01-27 | Stop reason: HOSPADM

## 2020-01-23 RX ORDER — PROCHLORPERAZINE EDISYLATE 5 MG/ML
10 INJECTION INTRAMUSCULAR; INTRAVENOUS EVERY 6 HOURS PRN
Status: DISCONTINUED | OUTPATIENT
Start: 2020-01-23 | End: 2020-01-27 | Stop reason: HOSPADM

## 2020-01-23 RX ORDER — HYDRALAZINE HYDROCHLORIDE 20 MG/ML
10 INJECTION INTRAMUSCULAR; INTRAVENOUS EVERY 6 HOURS PRN
Status: DISCONTINUED | OUTPATIENT
Start: 2020-01-23 | End: 2020-01-26

## 2020-01-23 RX ORDER — ASPIRIN 81 MG/1
81 TABLET ORAL DAILY
Status: DISCONTINUED | OUTPATIENT
Start: 2020-01-23 | End: 2020-01-23 | Stop reason: ALTCHOICE

## 2020-01-23 RX ORDER — ONDANSETRON 2 MG/ML
INJECTION INTRAMUSCULAR; INTRAVENOUS
Status: COMPLETED
Start: 2020-01-23 | End: 2020-01-23

## 2020-01-23 RX ORDER — ASPIRIN 300 MG/1
300 SUPPOSITORY RECTAL DAILY
Status: DISCONTINUED | OUTPATIENT
Start: 2020-01-24 | End: 2020-01-27 | Stop reason: HOSPADM

## 2020-01-23 RX ORDER — SODIUM CHLORIDE 0.9 % (FLUSH) 0.9 %
10 SYRINGE (ML) INJECTION EVERY 12 HOURS SCHEDULED
Status: DISCONTINUED | OUTPATIENT
Start: 2020-01-23 | End: 2020-01-27 | Stop reason: HOSPADM

## 2020-01-23 RX ORDER — SODIUM CHLORIDE 0.9 % (FLUSH) 0.9 %
10 SYRINGE (ML) INJECTION PRN
Status: DISCONTINUED | OUTPATIENT
Start: 2020-01-23 | End: 2020-01-27 | Stop reason: HOSPADM

## 2020-01-23 RX ORDER — ONDANSETRON 2 MG/ML
INJECTION INTRAMUSCULAR; INTRAVENOUS
Status: DISPENSED
Start: 2020-01-23 | End: 2020-01-24

## 2020-01-23 RX ORDER — SIMVASTATIN 40 MG
40 TABLET ORAL NIGHTLY
COMMUNITY

## 2020-01-23 RX ORDER — LOSARTAN POTASSIUM 25 MG/1
100 TABLET ORAL DAILY
Status: CANCELLED | OUTPATIENT
Start: 2020-01-23

## 2020-01-23 RX ORDER — TACROLIMUS 1 MG/1
1 CAPSULE ORAL 2 TIMES DAILY
Status: DISCONTINUED | OUTPATIENT
Start: 2020-01-23 | End: 2020-01-25

## 2020-01-23 RX ORDER — ASPIRIN 81 MG/1
81 TABLET, CHEWABLE ORAL DAILY
Status: DISCONTINUED | OUTPATIENT
Start: 2020-01-24 | End: 2020-01-27 | Stop reason: HOSPADM

## 2020-01-23 RX ORDER — INSULIN LISPRO 100 [IU]/ML
5 INJECTION, SOLUTION INTRAVENOUS; SUBCUTANEOUS
Status: DISCONTINUED | OUTPATIENT
Start: 2020-01-24 | End: 2020-01-27 | Stop reason: HOSPADM

## 2020-01-23 RX ORDER — ONDANSETRON 2 MG/ML
4 INJECTION INTRAMUSCULAR; INTRAVENOUS ONCE
Status: COMPLETED | OUTPATIENT
Start: 2020-01-23 | End: 2020-01-23

## 2020-01-23 RX ORDER — LEVOTHYROXINE SODIUM 0.03 MG/1
25 TABLET ORAL DAILY
COMMUNITY

## 2020-01-23 RX ORDER — PANTOPRAZOLE SODIUM 40 MG/10ML
40 INJECTION, POWDER, LYOPHILIZED, FOR SOLUTION INTRAVENOUS DAILY
Status: DISCONTINUED | OUTPATIENT
Start: 2020-01-23 | End: 2020-01-27 | Stop reason: HOSPADM

## 2020-01-23 RX ORDER — ATORVASTATIN CALCIUM 80 MG/1
80 TABLET, FILM COATED ORAL NIGHTLY
Status: DISCONTINUED | OUTPATIENT
Start: 2020-01-23 | End: 2020-01-23 | Stop reason: ALTCHOICE

## 2020-01-23 RX ORDER — TACROLIMUS 1 MG/1
2 CAPSULE ORAL 2 TIMES DAILY
COMMUNITY

## 2020-01-23 RX ORDER — DEXTROSE MONOHYDRATE 50 MG/ML
100 INJECTION, SOLUTION INTRAVENOUS PRN
Status: DISCONTINUED | OUTPATIENT
Start: 2020-01-23 | End: 2020-01-27 | Stop reason: HOSPADM

## 2020-01-23 RX ORDER — HYDRALAZINE HYDROCHLORIDE 20 MG/ML
5 INJECTION INTRAMUSCULAR; INTRAVENOUS ONCE
Status: COMPLETED | OUTPATIENT
Start: 2020-01-23 | End: 2020-01-23

## 2020-01-23 RX ADMIN — DESMOPRESSIN ACETATE 40 MG: 0.2 TABLET ORAL at 21:39

## 2020-01-23 RX ADMIN — Medication 10 ML: at 21:40

## 2020-01-23 RX ADMIN — INSULIN LISPRO 2 UNITS: 100 INJECTION, SOLUTION INTRAVENOUS; SUBCUTANEOUS at 23:25

## 2020-01-23 RX ADMIN — ONDANSETRON 4 MG: 2 INJECTION INTRAMUSCULAR; INTRAVENOUS at 19:23

## 2020-01-23 RX ADMIN — HYDRALAZINE HYDROCHLORIDE 5 MG: 20 INJECTION INTRAMUSCULAR; INTRAVENOUS at 16:28

## 2020-01-23 RX ADMIN — MYCOPHENOLATE MOFETIL 750 MG: 250 CAPSULE ORAL at 21:34

## 2020-01-23 RX ADMIN — AMLODIPINE BESYLATE 5 MG: 5 TABLET ORAL at 21:39

## 2020-01-23 RX ADMIN — TACROLIMUS 1 MG: 1 CAPSULE ORAL at 21:35

## 2020-01-23 RX ADMIN — ONDANSETRON 4 MG: 2 INJECTION INTRAMUSCULAR; INTRAVENOUS at 14:26

## 2020-01-23 RX ADMIN — LORAZEPAM 1 MG: 2 INJECTION INTRAMUSCULAR; INTRAVENOUS at 16:10

## 2020-01-23 RX ADMIN — PANTOPRAZOLE SODIUM 40 MG: 40 INJECTION, POWDER, LYOPHILIZED, FOR SOLUTION INTRAVENOUS at 21:39

## 2020-01-23 RX ADMIN — Medication 10 ML: at 21:39

## 2020-01-23 RX ADMIN — INSULIN GLARGINE 43 UNITS: 100 INJECTION, SOLUTION SUBCUTANEOUS at 23:27

## 2020-01-23 RX ADMIN — HYDRALAZINE HYDROCHLORIDE 10 MG: 20 INJECTION INTRAMUSCULAR; INTRAVENOUS at 23:43

## 2020-01-23 NOTE — ED NOTES
Pt placed on continuous pulse oximetry and telemetry monitoring. Pt placed on cycling blood pressure. Fall risk precautions in place, call light in reach, bed side table within reach, bed alarm on, will continue to monitor.          Shivam Ley RN  01/23/20 3196

## 2020-01-23 NOTE — ED NOTES
Bed: 01  Expected date:   Expected time:   Means of arrival:   Comments:  Emergency Use     Melinda Galindo RN  01/23/20 0150

## 2020-01-23 NOTE — ED PROVIDER NOTES
changes appreciated. No evidence of acute ischemia. Radiology  CT HEAD WO CONTRAST   Final Result   No hemorrhage or mass identified      Atrophy and small-vessel ischemic change, similar to prior. Mild paranasal sinus disease      Results discussed with EZEQUIEL SIMS by Jennifer Smith.  Susan Hernandez MD at 2:24 pm on   1/23/2020         XR CHEST PORTABLE    (Results Pending)   CTA NECK W CONTRAST    (Results Pending)   CTA HEAD W CONTRAST    (Results Pending)       Labs  Results for orders placed or performed during the hospital encounter of 01/23/20   CBC Auto Differential   Result Value Ref Range    WBC 7.3 4.0 - 11.0 K/uL    RBC 4.93 4.20 - 5.90 M/uL    Hemoglobin 14.2 13.5 - 17.5 g/dL    Hematocrit 42.3 40.5 - 52.5 %    MCV 85.9 80.0 - 100.0 fL    MCH 28.7 26.0 - 34.0 pg    MCHC 33.5 31.0 - 36.0 g/dL    RDW 15.8 (H) 12.4 - 15.4 %    Platelets 132 998 - 767 K/uL    MPV 8.4 5.0 - 10.5 fL    Neutrophils % 81.3 %    Lymphocytes % 10.4 %    Monocytes % 6.6 %    Eosinophils % 0.6 %    Basophils % 1.1 %    Neutrophils Absolute 5.9 1.7 - 7.7 K/uL    Lymphocytes Absolute 0.8 (L) 1.0 - 5.1 K/uL    Monocytes Absolute 0.5 0.0 - 1.3 K/uL    Eosinophils Absolute 0.0 0.0 - 0.6 K/uL    Basophils Absolute 0.1 0.0 - 0.2 K/uL   Comprehensive Metabolic Panel w/ Reflex to MG   Result Value Ref Range    Sodium 136 136 - 145 mmol/L    Potassium reflex Magnesium 4.3 3.5 - 5.1 mmol/L    Chloride 98 (L) 99 - 110 mmol/L    CO2 27 21 - 32 mmol/L    Anion Gap 11 3 - 16    Glucose 243 (H) 70 - 99 mg/dL    BUN 20 7 - 20 mg/dL    CREATININE 1.4 (H) 0.8 - 1.3 mg/dL    GFR Non-African American 50 (A) >60    GFR  60 (A) >60    Calcium 9.4 8.3 - 10.6 mg/dL    Total Protein 6.1 (L) 6.4 - 8.2 g/dL    Alb 3.4 3.4 - 5.0 g/dL    Albumin/Globulin Ratio 1.3 1.1 - 2.2    Total Bilirubin 0.7 0.0 - 1.0 mg/dL    Alkaline Phosphatase 72 40 - 129 U/L    ALT 12 10 - 40 U/L    AST 11 (L) 15 - 37 U/L    Globulin 2.7 g/dL   Troponin   Result Value while evaluating and treating this patient was at least 45 minutes. This excludes time spent doing separately billable procedures. This includes time at the bedside, data interpretation, medication management, obtaining critical history from collateral sources if the patient is unable to provide it directly, and physician consultation. Specifics of interventions taken and potentially life-threatening diagnostic considerations are listed above in the medical decision making. [unfilled]    Final Impression  1. Aphasia        Blood pressure (!) 226/82, pulse 74, SpO2 100 %. Disposition:  DISPOSITION Decision To Admit 01/23/2020 02:27:36 PM      Patient Referrals:  No follow-up provider specified. Discharge Medications:  New Prescriptions    No medications on file       Discontinued Medications:  Discontinued Medications    No medications on file       This chart was generated using the [employer] dictation system. I created this record but it may contain dictation errors given the limitations of this technology.        Suzanna Amaro MD  01/23/20 4345

## 2020-01-23 NOTE — ED NOTES
MRI called and unable to finish scans, due to, pt would lay still and confused. New orders received.      Shala Coreas RN  01/23/20 2130

## 2020-01-23 NOTE — H&P
HOSPITALISTS HISTORY AND PHYSICAL    1/23/2020 5:48 PM    Patient Information:  Rachell Cuevas is a 69 y.o. male [id number]  PCP:  No primary care provider on file. (Tel: None )    Chief complaint:    Chief Complaint   Patient presents with    Cerebrovascular Accident     pt arrived via squad. pt have stroke like symptoms with his talking. History of Present Illness:  Louisa Lu is a 69 y.o. male who presented with symptoms of stroke. Patient was noted to be a phasic per wife last well-known was 11:30 AM.  Patient had difficulty giving history per EMS patient had nausea x2. Patient on arrival again remains a phasic findings say something could not comprehend. Stroke alert was called from the ER. Given patient had recent TPA last month not a candidate. Underwent MRI which did not show any acute finding could not do IV contrast due to renal issues. Patient also noted to be hypertensive blood pressure in 210 on arrival.  No other focal deficit noted. Was admitted for stroke had TPA last month was doing well was discharged stable. REVIEW OF SYSTEMS:   Constitutional: Negative for fever,chills or night sweats  ENT: Negative for rhinorrhea, epistaxis, hoarseness, sore throat. Respiratory: Negative for shortness of breath,wheezing  Cardiovascular: Negative for chest pain, palpitations   Gastrointestinal: Negative for nausea, vomiting, diarrhea  Genitourinary: Negative for polyuria, dysuria   Hematologic/Lymphatic: Negative for bleeding tendency, easy bruising  Musculoskeletal: Negative for myalgias and arthralgias  Neurologic: Negative for confusion,dysarthria. Skin: Negative for itching,rash, good capillary refill. Psychiatric: Negative for depression,anxiety, agitation. Endocrine: Negative for polydipsia,polyuria,heat /cold intolerance.     Past Medical History:   has a past medical history of Arthritis, Atrial fibrillation ([facility] [facility] Cancer [address] Diabetes mellitus ([facility] [facility] History of blood transfusion, Hyperlipidemia, and Hypertension. Past Surgical History:   has a past surgical history that includes eye surgery and joint replacement. Medications:  No current facility-administered medications on file prior to encounter. Current Outpatient Medications on File Prior to Encounter   Medication Sig Dispense Refill    atorvastatin (LIPITOR) 40 MG tablet Take 1 tablet by mouth nightly 30 tablet 3    clopidogrel (PLAVIX) 75 MG tablet Take 1 tablet by mouth daily 30 tablet 3    losartan (COZAAR) 100 MG tablet Take 100 mg by mouth daily      amLODIPine (NORVASC) 10 MG tablet Take 5 mg by mouth daily      sirolimus (RAPAMUNE) 1 MG tablet Take 1 mg by mouth daily      levothyroxine (SYNTHROID) 100 MCG injection Infuse 50 mcg intravenously Daily      Calcium Carb-Cholecalciferol (CALTRATE 600+D) 600-800 MG-UNIT TABS Take 1 tablet by mouth daily      insulin aspart (NOVOLOG) 100 UNIT/ML injection vial Inject into the skin 3 times daily (before meals)      insulin glargine (LANTUS) 100 UNIT/ML injection vial Inject 43 Units into the skin nightly       aspirin 81 MG chewable tablet Take 81 mg by mouth daily      mycophenolate (CELLCEPT) 250 MG capsule Take 750 mg by mouth 2 times daily       Omega-3 Fatty Acids (FISH OIL) 1000 MG CAPS Take 1,200 mg by mouth daily          Allergies: Allergies   Allergen Reactions    Thymoglobulin [Anti-Thymocyte Glob (Rabbit)] Other (See Comments)     Spouse states patient \"went berserk\"        Social History:   reports that he quit smoking about 25 years ago. His smoking use included cigarettes. He started smoking about 58 years ago. He has a 33.00 pack-year smoking history. He has never used smokeless tobacco. He reports that he does not drink alcohol or use drugs.      Family History:  family history includes Allergy (Severe) in his child; Arrhythmia in his mother; Arthritis in his mother; Asthma in his child; Diabetes in his mother; High Blood Pressure in his father and mother; Obesity in his child, father, and mother. ,     Physical Exam:  BP (!) 179/145   Pulse 76   Temp 98.1 °F (36.7 °C) (Infrared)   Resp 18   Ht 6' (1.829 m)   Wt 198 lb (89.8 kg)   SpO2 97%   BMI 26.85 kg/m²     General appearance:  Appears comfortable. Well nourished  Eyes: Sclera clear, pupils equal  ENT: Moist mucus membranes, no thrush. Trachea midline. Cardiovascular: Regular rhythm, normal S1, S2. No murmur, gallop, rub. No edema in lower extremities  Respiratory: Clear to auscultation bilaterally, no wheeze, good inspiratory effort  Gastrointestinal: Abdomen soft, non-tender, not distended, normal bowel sounds  Musculoskeletal: No cyanosis in digits, neck supple  Neurology: Cranial nerves grossly intact. Alert and oriented in time, place and person. N has expressive aphasia. Psychiatry: Appropriate affect. Not agitated  Skin: Warm, dry, normal turgor, no rash    Labs:  CBC:   Lab Results   Component Value Date    WBC 7.3 01/23/2020    RBC 4.93 01/23/2020    HGB 14.2 01/23/2020    HCT 42.3 01/23/2020    MCV 85.9 01/23/2020    MCH 28.7 01/23/2020    MCHC 33.5 01/23/2020    RDW 15.8 01/23/2020     01/23/2020    MPV 8.4 01/23/2020     BMP:    Lab Results   Component Value Date     01/23/2020    K 4.3 01/23/2020    CL 98 01/23/2020    CO2 27 01/23/2020    BUN 20 01/23/2020    CREATININE 1.4 01/23/2020    CALCIUM 9.4 01/23/2020    GFRAA 60 01/23/2020    LABGLOM 50 01/23/2020    GLUCOSE 243 01/23/2020       Chest Xray:   EKG:    I visualized CXR images and EKG strips      Problem List  Active Problems:    Aphasia  Resolved Problems:    * No resolved hospital problems. *        Assessment/Plan:   Aphasia  -Expressive.  -Stroke call.   Not a candidate for TPA due to recent history of stroke and TPA given  -An MRI did not show any acute occluding stroke or no major vessel disease  -We will continue medical management this

## 2020-01-24 LAB
ALBUMIN SERPL-MCNC: 3.3 G/DL (ref 3.4–5)
ANION GAP SERPL CALCULATED.3IONS-SCNC: 13 MMOL/L (ref 3–16)
BUN BLDV-MCNC: 24 MG/DL (ref 7–20)
CALCIUM SERPL-MCNC: 9.1 MG/DL (ref 8.3–10.6)
CHLORIDE BLD-SCNC: 101 MMOL/L (ref 99–110)
CHOLESTEROL, TOTAL: 124 MG/DL (ref 0–199)
CO2: 25 MMOL/L (ref 21–32)
CREAT SERPL-MCNC: 1.7 MG/DL (ref 0.8–1.3)
EKG ATRIAL RATE: 72 BPM
EKG DIAGNOSIS: NORMAL
EKG P AXIS: 35 DEGREES
EKG P-R INTERVAL: 198 MS
EKG Q-T INTERVAL: 400 MS
EKG QRS DURATION: 98 MS
EKG QTC CALCULATION (BAZETT): 438 MS
EKG R AXIS: -18 DEGREES
EKG T AXIS: -11 DEGREES
EKG VENTRICULAR RATE: 72 BPM
ESTIMATED AVERAGE GLUCOSE: 171.4 MG/DL
GFR AFRICAN AMERICAN: 48
GFR NON-AFRICAN AMERICAN: 40
GLUCOSE BLD-MCNC: 137 MG/DL (ref 70–99)
GLUCOSE BLD-MCNC: 149 MG/DL (ref 70–99)
GLUCOSE BLD-MCNC: 152 MG/DL (ref 70–99)
GLUCOSE BLD-MCNC: 176 MG/DL (ref 70–99)
GLUCOSE BLD-MCNC: 193 MG/DL (ref 70–99)
GLUCOSE BLD-MCNC: 222 MG/DL (ref 70–99)
GLUCOSE BLD-MCNC: 93 MG/DL (ref 70–99)
HBA1C MFR BLD: 7.6 %
HCT VFR BLD CALC: 41.8 % (ref 40.5–52.5)
HDLC SERPL-MCNC: 48 MG/DL (ref 40–60)
HEMOGLOBIN: 14 G/DL (ref 13.5–17.5)
LDL CHOLESTEROL CALCULATED: 54 MG/DL
MCH RBC QN AUTO: 28.5 PG (ref 26–34)
MCHC RBC AUTO-ENTMCNC: 33.5 G/DL (ref 31–36)
MCV RBC AUTO: 85.1 FL (ref 80–100)
PDW BLD-RTO: 16 % (ref 12.4–15.4)
PERFORMED ON: ABNORMAL
PERFORMED ON: NORMAL
PHOSPHORUS: 3.5 MG/DL (ref 2.5–4.9)
PLATELET # BLD: 212 K/UL (ref 135–450)
PMV BLD AUTO: 8.1 FL (ref 5–10.5)
POTASSIUM SERPL-SCNC: 4.1 MMOL/L (ref 3.5–5.1)
RBC # BLD: 4.91 M/UL (ref 4.2–5.9)
SODIUM BLD-SCNC: 139 MMOL/L (ref 136–145)
TRIGL SERPL-MCNC: 111 MG/DL (ref 0–150)
VLDLC SERPL CALC-MCNC: 22 MG/DL
WBC # BLD: 11.7 K/UL (ref 4–11)

## 2020-01-24 PROCEDURE — 95819 EEG AWAKE AND ASLEEP: CPT

## 2020-01-24 PROCEDURE — G0378 HOSPITAL OBSERVATION PER HR: HCPCS

## 2020-01-24 PROCEDURE — 95819 EEG AWAKE AND ASLEEP: CPT | Performed by: PSYCHIATRY & NEUROLOGY

## 2020-01-24 PROCEDURE — 2580000003 HC RX 258: Performed by: INTERNAL MEDICINE

## 2020-01-24 PROCEDURE — 99223 1ST HOSP IP/OBS HIGH 75: CPT | Performed by: PSYCHIATRY & NEUROLOGY

## 2020-01-24 PROCEDURE — 36415 COLL VENOUS BLD VENIPUNCTURE: CPT

## 2020-01-24 PROCEDURE — 92610 EVALUATE SWALLOWING FUNCTION: CPT

## 2020-01-24 PROCEDURE — 97162 PT EVAL MOD COMPLEX 30 MIN: CPT

## 2020-01-24 PROCEDURE — 6370000000 HC RX 637 (ALT 250 FOR IP): Performed by: PSYCHIATRY & NEUROLOGY

## 2020-01-24 PROCEDURE — 83036 HEMOGLOBIN GLYCOSYLATED A1C: CPT

## 2020-01-24 PROCEDURE — 97116 GAIT TRAINING THERAPY: CPT

## 2020-01-24 PROCEDURE — 93010 ELECTROCARDIOGRAM REPORT: CPT | Performed by: INTERNAL MEDICINE

## 2020-01-24 PROCEDURE — 6370000000 HC RX 637 (ALT 250 FOR IP): Performed by: HOSPITALIST

## 2020-01-24 PROCEDURE — 80069 RENAL FUNCTION PANEL: CPT

## 2020-01-24 PROCEDURE — 92523 SPEECH SOUND LANG COMPREHEN: CPT

## 2020-01-24 PROCEDURE — 97110 THERAPEUTIC EXERCISES: CPT

## 2020-01-24 PROCEDURE — 2580000003 HC RX 258: Performed by: HOSPITALIST

## 2020-01-24 PROCEDURE — C9113 INJ PANTOPRAZOLE SODIUM, VIA: HCPCS | Performed by: HOSPITALIST

## 2020-01-24 PROCEDURE — 96372 THER/PROPH/DIAG INJ SC/IM: CPT

## 2020-01-24 PROCEDURE — 96376 TX/PRO/DX INJ SAME DRUG ADON: CPT

## 2020-01-24 PROCEDURE — 6360000002 HC RX W HCPCS: Performed by: HOSPITALIST

## 2020-01-24 PROCEDURE — 80061 LIPID PANEL: CPT

## 2020-01-24 PROCEDURE — 6370000000 HC RX 637 (ALT 250 FOR IP): Performed by: INTERNAL MEDICINE

## 2020-01-24 PROCEDURE — 85027 COMPLETE CBC AUTOMATED: CPT

## 2020-01-24 PROCEDURE — 92526 ORAL FUNCTION THERAPY: CPT

## 2020-01-24 RX ORDER — SIROLIMUS 1 MG/1
1 TABLET, FILM COATED ORAL DAILY
Status: DISCONTINUED | OUTPATIENT
Start: 2020-01-24 | End: 2020-01-24

## 2020-01-24 RX ORDER — LEVETIRACETAM 500 MG/1
500 TABLET ORAL 2 TIMES DAILY
Status: DISCONTINUED | OUTPATIENT
Start: 2020-01-24 | End: 2020-01-27 | Stop reason: HOSPADM

## 2020-01-24 RX ORDER — AMLODIPINE BESYLATE 5 MG/1
10 TABLET ORAL DAILY
Status: DISCONTINUED | OUTPATIENT
Start: 2020-01-24 | End: 2020-01-27 | Stop reason: HOSPADM

## 2020-01-24 RX ORDER — LOSARTAN POTASSIUM 100 MG/1
100 TABLET ORAL DAILY
Status: DISCONTINUED | OUTPATIENT
Start: 2020-01-24 | End: 2020-01-24

## 2020-01-24 RX ORDER — SODIUM CHLORIDE 9 MG/ML
INJECTION, SOLUTION INTRAVENOUS CONTINUOUS
Status: ACTIVE | OUTPATIENT
Start: 2020-01-24 | End: 2020-01-25

## 2020-01-24 RX ADMIN — INSULIN LISPRO 1 UNITS: 100 INJECTION, SOLUTION INTRAVENOUS; SUBCUTANEOUS at 13:20

## 2020-01-24 RX ADMIN — MYCOPHENOLATE MOFETIL 750 MG: 250 CAPSULE ORAL at 22:12

## 2020-01-24 RX ADMIN — ASPIRIN 81 MG 81 MG: 81 TABLET ORAL at 10:15

## 2020-01-24 RX ADMIN — MYCOPHENOLATE MOFETIL 750 MG: 250 CAPSULE ORAL at 10:15

## 2020-01-24 RX ADMIN — AMLODIPINE BESYLATE 10 MG: 5 TABLET ORAL at 10:15

## 2020-01-24 RX ADMIN — ENOXAPARIN SODIUM 40 MG: 40 INJECTION SUBCUTANEOUS at 10:15

## 2020-01-24 RX ADMIN — TACROLIMUS 1 MG: 1 CAPSULE ORAL at 10:15

## 2020-01-24 RX ADMIN — LEVETIRACETAM 500 MG: 500 TABLET ORAL at 17:00

## 2020-01-24 RX ADMIN — Medication 10 ML: at 10:15

## 2020-01-24 RX ADMIN — SODIUM CHLORIDE: 9 INJECTION, SOLUTION INTRAVENOUS at 10:25

## 2020-01-24 RX ADMIN — DESMOPRESSIN ACETATE 40 MG: 0.2 TABLET ORAL at 22:12

## 2020-01-24 RX ADMIN — Medication 10 ML: at 22:16

## 2020-01-24 RX ADMIN — INSULIN LISPRO 5 UNITS: 100 INJECTION, SOLUTION INTRAVENOUS; SUBCUTANEOUS at 17:00

## 2020-01-24 RX ADMIN — INSULIN LISPRO 1 UNITS: 100 INJECTION, SOLUTION INTRAVENOUS; SUBCUTANEOUS at 17:00

## 2020-01-24 RX ADMIN — PANTOPRAZOLE SODIUM 40 MG: 40 INJECTION, POWDER, LYOPHILIZED, FOR SOLUTION INTRAVENOUS at 10:15

## 2020-01-24 RX ADMIN — TACROLIMUS 1 MG: 1 CAPSULE ORAL at 22:13

## 2020-01-24 RX ADMIN — INSULIN LISPRO 5 UNITS: 100 INJECTION, SOLUTION INTRAVENOUS; SUBCUTANEOUS at 13:20

## 2020-01-24 RX ADMIN — CLOPIDOGREL 75 MG: 75 TABLET, FILM COATED ORAL at 10:15

## 2020-01-24 ASSESSMENT — PAIN SCALES - GENERAL
PAINLEVEL_OUTOF10: 0

## 2020-01-24 NOTE — PROGRESS NOTES
Pt took HS meds without difficulty. Urinated bloody urine, continues discomfort with urination  - pt pulled out kidd catheter in ER. Callbell in reach, bed alrm on and video monitor in use.   nasreen Thibodeaux RN

## 2020-01-24 NOTE — PROGRESS NOTES
Speech Language/Pathology   SPEECH LANGUAGE AND CLINICAL BEDSIDE SWALLOWING EVALUATION   Speech Therapy Department     Patient Name:  Sloane Weaver  :  1945  Pain level: denied  Medical Diagnosis:  Aphasia [R47.01]  Aphasia [R47.01]    HPI: \"Luis Fernando Mcallister is a 69 y.o. male who presented with symptoms of stroke. Patient was noted to be a phasic per wife last well-known was 11:30 AM.  Patient had difficulty giving history per EMS patient had nausea x2. Patient on arrival again remains a phasic findings say something could not comprehend. Stroke alert was called from the ER. Given patient had recent TPA last month not a candidate. Underwent MRI which did not show any acute finding could not do IV contrast due to renal issues. Patient also noted to be hypertensive blood pressure in 210 on arrival.  No other focal deficit noted. Was admitted for stroke had TPA last month was doing well was discharged stable. \"    CXR: 20  Impression   No acute cardiopulmonary disease. MRI Brain: 20  Impression   Limited code stroke scan.       No acute infarct identified. \"Speech Language Pathology eval and treat\" orders received. Pt referred to SLP due to stroke protocol. Pt is current on a regular texture diet/thin liquids. He was previously seen by this speech department in 2018 and 2019. Most recently, he was discharged on a regular texture diet/thin liquids. Pt also referred for difficulty with speech (word finding deficits). CLINICAL SWALLOW EVALUATION:  Dysphagia Treatment Diagnosis: Oropharyngeal Dysphagia     Impressions: Pt presents with minimal oropharyngeal dysphagia. Dentition is natural; pt is missing a few lower molars but indicated this does not affect chewing ability. Lingual/labial strength/ROM/coordination appeared [facility]. Pt's family reported pt has had 2 previous esophageal dilations and was supposed to have a third last week.  She reported pt frequently vomits after eating

## 2020-01-24 NOTE — PROGRESS NOTES
Goals;PT Role;Plan of Care;Family Education;Precautions;General Safety;Transfer Training;Gait Training;Functional Mobility Training  Patient Education: Instructed patient only be up with staff assist.  Barriers to Learning: possibly cognition  REQUIRES PT FOLLOW UP: Yes  Activity Tolerance  Activity Tolerance: Patient limited by fatigue;Patient limited by endurance  Activity Tolerance: Patient returned to bed and immediately fell asleep. Patient Diagnosis(es): The encounter diagnosis was Aphasia. has a past medical history of Arthritis, Atrial fibrillation ([address] Cancer ([address] Diabetes mellitus ([address] History of blood transfusion, Hyperlipidemia, and Hypertension. has a past surgical history that includes eye surgery and joint replacement. Restrictions  Restrictions/Precautions  Restrictions/Precautions: Fall Risk(high fall risk)  Required Braces or Orthoses?: No  Position Activity Restriction  Other position/activity restrictions: Patient admitted w/ aphasia, which is improving. Brain CT and MRI negative for acute infarct. Patient here in December for same symptoms and given TPA. No residual effects per patient and spouse. Patient also had similar event in December 2018. Vision/Hearing  Vision: Within Functional Limits  Hearing: Within functional limits       Subjective  General  Chart Reviewed: Yes  Additional Pertinent Hx: AFIB, OA, DM, HTN, cancer, B TKA, kidney transplant (2008), ESRD  Response To Previous Treatment: Not applicable  Family / Caregiver Present: Yes(spouse)  Diagnosis: HTN (715 systolic), acute renal failure  Follows Commands: Within Functional Limits  General Comment  Comments: Patient supine in bed w/ spouse present. Subjective  Subjective: Patient states speech is improving but he still notices difficulty speaking. He denies any other physical deficits other than generalized soreness which is not a new symptom.   Pain Screening  Patient Currently in Pain: Denies Orientation  Orientation  Overall Orientation Status: Within Normal Limits     Social/Functional History  Social/Functional History  Lives With: Spouse, Family(daughter, son-in-law, adult granddaughter)  Type of Home: House  Home Layout: One level  Home Access: Level entry  Bathroom Shower/Tub: Walk-in shower  Bathroom Toilet: Handicap height  Bathroom Accessibility: Accessible  ADL Assistance: Independent  Homemaking Assistance: Independent  Homemaking Responsibilities: Yes  Ambulation Assistance: Independent  Transfer Assistance: Independent  Active : Yes  Occupation: Retired(maintenance)  Additional Comments: Denies falls. Objective  Observation/Palpation  Posture: Good  Observation: Patient appears fatigued. AROM RLE (degrees)  RLE AROM: WNL  AROM LLE (degrees)  LLE AROM : WNL  Strength RLE  Strength RLE: WFL  Comment: 5/5 per MMT - some difficulty coordinating/following commands to perform test  Strength LLE  Strength LLE: WFL  Comment: 5/5 per MMT - some difficulty coordinating/following commands to perform test  Tone RLE  RLE Tone: Normotonic  Tone LLE  LLE Tone: Normotonic  Motor Control  Gross Motor?: WNL  Coordination  Heel to Shin: Abnormal(Appeared to have mild coordination deficit.)  Sensation  Overall Sensation Status: WFL  Bed mobility  Rolling to Right: Modified independent  Supine to Sit: Modified independent  Sit to Supine: Modified independent  Scooting: Modified independent  Transfers  Sit to Stand: Contact guard assistance  Stand to sit: Contact guard assistance  Bed to Chair: Contact guard assistance  Stand Pivot Transfers: Contact guard assistance  Ambulation  Ambulation?: Yes  More Ambulation?: Yes  Ambulation 1  Surface: level tile  Device: No Device  Assistance: Minimal assistance  Quality of Gait: Poor. Gait Deviations: Slow [place]; Increased OZ; Decreased step length;Decreased step height;Deviated path;Staggers;Decreased arm swing  Distance: 100'  Ambulation 2  Surface - 2: level tile  Device 2: [address] 2: Contact guard assistance  Gait Deviations: Decreased step height;Decreased step length  Distance: 100'  Comments: Improved safety and balance but still impaired. Patient verbalizes feeling off balance and dizzy. Stairs/Curb  Stairs?: No(Patient declined to attempt stairs.)     Balance  Posture: Good  Sitting - Static: Good  Sitting - Dynamic: Fair;+  Standing - Static: Fair;-  Standing - Dynamic: Poor        Plan   Plan  Times per week: 5-7 TIA? Times per day: Daily  Current Treatment Recommendations: Strengthening, Transfer Training, Endurance Training, Neuromuscular Re-education, Cognitive Reorientation, Patient/Caregiver Education & Training, Equipment Evaluation, Education, & procurement, Home Exercise Program, Gait Training, Balance Training, Functional Mobility Training, Safety Education & Training  Safety Devices  Type of devices: All fall risk precautions in place, Bed alarm in place, Call light within reach, Gait belt, Left in bed, Telesitter in use, Nurse notified(RN present)  Restraints  Initially in place: No    AM-PAC Score  AM-PAC Inpatient Mobility Raw Score : 21 (01/24/20 1002)  AM-PAC Inpatient T-Scale Score : 50.25 (01/24/20 1002)  Mobility Inpatient CMS 0-100% Score: 28.97 (01/24/20 1002)  Mobility Inpatient CMS G-Code Modifier : CJ (01/24/20 1002)     Goals  Short term goals  Time Frame for Short term goals: Discharge. Short term goal 1: Patient to perform bed-chair transfers independently w/o a device. Short term goal 2: Patient to ambulate 150' independently w/o a device. Patient Goals   Patient goals : Return home. Walk independently/safely.        Therapy Time   Individual Concurrent Group Co-treatment   Time In 7454         Time Out 0956         Minutes 39         Timed Code Treatment Minutes: Reg[address] [place], DPT, ATC-R [id number]

## 2020-01-24 NOTE — CONSULTS
 Hyperlipidemia     Hypertension      Family History   Problem Relation Age of Onset    Arthritis Mother     Arrhythmia Mother     Diabetes Mother     High Blood Pressure Mother     Obesity Mother     High Blood Pressure Father     Obesity Father     Allergy (Severe) Child     Asthma Child     Obesity Child      Social History     Socioeconomic History    Marital status:      Spouse name: None    Number of children: None    Years of education: None    Highest education level: None   Occupational History    Occupation: retired   Social Needs    Financial resource strain: None    Food insecurity:     Worry: None     Inability: None    Transportation needs:     Medical: None     Non-medical: None   Tobacco Use    Smoking status: Former Smoker     Packs/day: 1.00     Years: 33.00     Pack years: 33.00     Types: Cigarettes     Start date:      Last attempt to quit:      Years since quittin.0    Smokeless tobacco: Never Used   Substance and Sexual Activity    Alcohol use: No    Drug use: No    Sexual activity: Yes     Partners: Female   Lifestyle    Physical activity:     Days per week: None     Minutes per session: None    Stress: None   Relationships    Social connections:     Talks on phone: None     Gets together: None     Attends Druze service: None     Active member of club or organization: None     Attends meetings of clubs or organizations: None     Relationship status: None    Intimate partner violence:     Fear of current or ex partner: None     Emotionally abused: None     Physically abused: None     Forced sexual activity: None   Other Topics Concern    None   Social History Narrative    None     Current Facility-Administered Medications   Medication Dose Route Frequency Provider Last Rate Last Dose    amLODIPine (NORVASC) tablet 10 mg  10 mg Oral Daily Greg Antonio MD   10 mg at 20 1015    0.9 % sodium chloride infusion   Intravenous Continuous Vladimir Pearson MD 75 mL/hr at 01/24/20 1025      aspirin chewable tablet 81 mg  81 mg Oral Daily Kaycee Miramontes MD   81 mg at 01/24/20 1015    atorvastatin (LIPITOR) tablet 40 mg  40 mg Oral Nightly Kaycee Miramontes MD   40 mg at 01/23/20 2139    clopidogrel (PLAVIX) tablet 75 mg  75 mg Oral Daily Kaycee Miramontes MD   75 mg at 01/24/20 1015    insulin lispro (1 Unit Dial) 5 Units  5 Units Subcutaneous TID WC Kaycee Miramontes MD   5 Units at 01/24/20 1320    insulin glargine (LANTUS) injection pen 43 Units  43 Units Subcutaneous Nightly José Miguel Andesron MD   43 Units at 01/23/20 2327    sodium chloride flush 0.9 % injection 10 mL  10 mL Intravenous 2 times per day José Miguel Anderson MD   10 mL at 01/24/20 1015    sodium chloride flush 0.9 % injection 10 mL  10 mL Intravenous PRN José Miguel Anderson MD   10 mL at 01/24/20 1015    magnesium hydroxide (MILK OF MAGNESIA) 400 MG/5ML suspension 30 mL  30 mL Oral Daily PRN José Miguel Anderson MD        ondansetron (ZOFRAN) injection 4 mg  4 mg Intravenous Q6H PRN Kaycee Miramontes MD        enoxaparin (LOVENOX) injection 40 mg  40 mg Subcutaneous Daily Kaycee Miramontes MD   40 mg at 01/24/20 1015    aspirin suppository 300 mg  300 mg Rectal Daily Kaycee Muller MD        labetalol (NORMODYNE;TRANDATE) injection 10 mg  10 mg Intravenous Q6H PRN José Miguel Anderson MD        hydrALAZINE (APRESOLINE) injection 10 mg  10 mg Intravenous Q6H PRN José Miguel Anderson MD   10 mg at 01/23/20 2343    glucose (GLUTOSE) 40 % oral gel 15 g  15 g Oral PRN Kaycee Miramontes MD        dextrose 50 % IV solution  12.5 g Intravenous PRN José Miguel Anderson MD        glucagon (rDNA) injection 1 mg  1 mg Intramuscular PRN José Miguel Anderson MD        dextrose 5 % solution  100 mL/hr Intravenous PRN Kaycee Miramontes MD        insulin lispro (1 Unit Dial) 0-6 Units  0-6 Units Subcutaneous TID WC José Miguel Anderson MD   1 Units at 01/24/20 1320    insulin lispro (1 Unit Dial) 0-3 Units  0-3 Units Subcutaneous Nightly José Miguel Anderson MD to [place] chinedu bilaterally    . Reflexes:  DTR 1 in the arms and absent in the legs  Plantar response: Withdrawal response bilaterally  Gait: Gait could not be checked Romberg: Could not be checked  Vascular: No carotid bruit bilaterally          DATA:  LABS:  General Labs:    CBC:   Lab Results   Component Value Date    WBC 11.7 01/24/2020    RBC 4.91 01/24/2020    HGB 14.0 01/24/2020    HCT 41.8 01/24/2020    MCV 85.1 01/24/2020    MCH 28.5 01/24/2020    MCHC 33.5 01/24/2020    RDW 16.0 01/24/2020     01/24/2020    MPV 8.1 01/24/2020     BMP:    Lab Results   Component Value Date     01/24/2020    K 4.1 01/24/2020    K 4.3 01/23/2020     01/24/2020    CO2 25 01/24/2020    BUN 24 01/24/2020    LABALBU 3.3 01/24/2020    CREATININE 1.7 01/24/2020    CALCIUM 9.1 01/24/2020    GFRAA 48 01/24/2020    LABGLOM 40 01/24/2020    GLUCOSE 193 01/24/2020     RADIOLOGY REVIEW:  I have reviewed radiology image(s) and reports(s) of: MRI brain    IMPRESSION :  Recurrent episodes of aphasia. Patient has had 3 identical episodes in the last 18 months. This would raise a question of partial seizure presenting or strokelike symptoms. Patient has been evaluated for stroke in the past.  Patient has had risk factors for stroke including hypertension and diabetes. He has diabetic peripheral neuropathy and some ataxia.   EEG was normal.  MRI brain did not show any acute stroke  Stroke work-up in December including echocardiogram and carotid Doppler studies were normal.  Patient Active Problem List   Diagnosis    TIA (transient ischemic attack)    Dysphasia    Headache    Aphasia    Encephalopathy, hypertensive    HTN (hypertension), benign    DM (diabetes mellitus), secondary, uncontrolled, w/neurologic complic ([address]    Dyslipidemia     RECOMMENDATIONS ;  Lengthy discussion with patient, his wife and daughter  Continue dual antiplatelet therapy  Start Keppra 500 mg twice daily  Patient family is very frustrated that they are not getting the care they need at the [facility] and are looking into other options. Thank you for this consultation        Please note a portion of this chart was generated using dragon dictation software. Although every effort was made to ensure the accuracy of this automated transcription, some errors in transcription may have occurred.

## 2020-01-24 NOTE — PLAN OF CARE
Problem: Pain:  Goal: Pain level will decrease  Description  Pain level will decrease  Outcome: Ongoing  Note:   Patient denies any pain at this time. Will continue to monitor. Problem: Falls - Risk of:  Goal: Will remain free from falls  Description  Will remain free from falls  Outcome: Ongoing  Note:   Patient remains absent from falls at this time. Remains in bed with call light and belongings in reach. Non-slip footwear on and 3/4 siderails raised. Bed remains in lowest/locked position at all times with alarm activated. Fall precautions in place. Camera in room. Patient/family encouraged to use call light to request assistance, v/u.  Will continue to monitor. Problem: HEMODYNAMIC STATUS  Goal: Patient has stable vital signs and fluid balance  1/24/2020 1038 by Tomasz Perry RN  Outcome: Ongoing  Note:   Patient BP elevated, other VSS at this time on room air. Will continue to monitor.

## 2020-01-24 NOTE — FLOWSHEET NOTE
01/23/20 2336   Vital Signs   Temp 98.5 °F (36.9 °C)   Temp Source Axillary   Pulse 83   Heart Rate Source Monitor   Resp 16   BP (!) 180/95  (PRN HYDRALAZINE TO BE GIVEN)   BP Location Left upper arm   MAP (mmHg) 125   Patient Position Semi fowlers       PRN hydralazine given per parameters, see NITA Dyson, RN

## 2020-01-24 NOTE — PLAN OF CARE
Problem: Serum Glucose Level - Abnormal:  Intervention: Manage insulin injections  Note:   Blood glucose 250. Insulin given, see MAR.

## 2020-01-24 NOTE — PROGRESS NOTES
[facility] hospital [id number] [place] [address] Dr Miller LOYD of floor for testing. Will follow up as available and schedule allows. Thank you,  Governor Masters.  [address]

## 2020-01-24 NOTE — PROGRESS NOTES
(85 kg)   SpO2 94%   BMI 25.40 kg/m²     Intake/Output Summary (Last 24 hours) at 1/24/2020 1013  Last data filed at 1/23/2020 2120  Gross per 24 hour   Intake --   Output 50 ml   Net -50 ml      Wt Readings from Last 3 Encounters:   01/24/20 187 lb 4.8 oz (85 kg)   12/25/19 198 lb 3.1 oz (89.9 kg)   12/24/18 200 lb 12.8 oz (91.1 kg)       General appearance:  Appears comfortable  Eyes: Sclera clear. Pupils equal.  ENT: Moist oral mucosa. Trachea midline, no adenopathy. Cardiovascular: Regular rhythm, normal S1, S2. No murmur. No edema in lower extremities  Respiratory: Not using accessory muscles. Good inspiratory effort. Clear to auscultation bilaterally, no wheeze or crackles. GI: Abdomen soft, no tenderness, not distended, normal bowel sounds  Musculoskeletal: No cyanosis in digits, neck supple  Neurology: CN 2-12 grossly intact. No speech or motor deficits  Psych: Normal affect. Alert and oriented in time, place and person  Skin: Warm, dry, normal turgor    Labs and Tests:  CBC:   Recent Labs     01/23/20  1407 01/24/20  0458   WBC 7.3 11.7*   HGB 14.2 14.0    212     BMP:    Recent Labs     01/23/20  1407 01/24/20  0458    139   K 4.3 4.1   CL 98* 101   CO2 27 25   BUN 20 24*   CREATININE 1.4* 1.7*   GLUCOSE 243* 193*     Hepatic:   Recent Labs     01/23/20  1407   AST 11*   ALT 12   BILITOT 0.7   ALKPHOS 72       Discussed care with family and patient             Spent 30  minutes with patient and family at bedside and on unit reviewing medical records and labs, spent greater than 50% time counseling patient and family on diagnosis and plan   Problem List  Active Problems:    Aphasia  Resolved Problems:    * No resolved hospital problems. *       Assessment & Plan:   1. Expressive aphasia  -Presentation yesterday with patient not making sense  -He is back to baseline answering all the questions appropriately  -Unclear etiology if he had any TIA.,  MRI head was negative.   Patient was not a candidate for TPA  -This is the second episode in 2 months. Patient had TPA on last admission  -The rest of the work-up was otherwise negative except for his blood pressure was elevated on arrival which is stable now  -Await neurology recommendation to possible cause of the symptoms  -Continue aspirin Plavix for now statin. 2.  Acute kidney injury  -Creatinine is 1.7  -With history of renal transplant  -Per nephrology. 3.  Uncontrolled hypertension  -Improved now. Was in 80s on arrival  -Continue adjusting medication based on nephrology rec given chronic kidney disease    4. Nausea vomiting  -Ongoing for the past few weeks now  -Patient with history of stricture and needing frequent dilation  -Was supposed to get one done but did not get it done due to patient being on Plavix  -If patient persistently having nausea vomiting and pain then consider GI consult.         Diet: DIET CARB CONTROL;  Code:Full Code  DVT PPX lovenox       José Miguel Anderson MD   1/24/2020 10:13 AM

## 2020-01-24 NOTE — CONSULTS
benign    DM (diabetes mellitus), secondary, uncontrolled, w/neurologic complic ([facility]    Dyslipidemia     : other supportive care :   - Check daily renal function panel with electrolytes-phosphorus  - Strict monitoring of I/Os, daily weight  - Renal feeds/diet  - Current medications reviewed. - Nephrotoxic medications have been discontinued. - Dose adjusted and appropriate. - Dose meds for eGFR ~ 50 mL/min/1.73m2    - Avoid heavy opioids due to renal failure - may use very low dose dilaudid / fentanyl with close monitoring of CNS and respiratory depression. Please refer to the orders. High Complexity. Multiple complex problems. Discussed with patient, family - his wife and treatment team-   Thank you for allowing me to participate in this patient's care. Please do not hesitate to contact me with any questions/concerns. We will follow along with you. Nadine Gandhi MD       [facility] [address] [place] [address]  Office: [phone] or [employer]  Fax: [phone]          CHIEF COMPLAINT:   Chief Complaint   Patient presents with    Cerebrovascular Accident     pt arrived via squad. pt have stroke like symptoms with his talking. History Obtained From:  patient, spouse, electronic medical record    HPI: Mr. Lisa Yang is a 69 y.o. male with significant past medical history of   Past Medical History:   Diagnosis Date    Arthritis     Atrial fibrillation ([facility]     [facility]     Diabetes mellitus ([facility]     History of blood transfusion     Hyperlipidemia     Hypertension     ,   presents with Cerebrovascular Accident (pt arrived via squad. pt have stroke like symptoms with his talking. )    Hospital Problems           Last Modified POA    Aphasia 1/23/2020 Yes      so admitted for further eval.   Patient denied SOB/chest pain/dizziness/lightheadedness/syncope/leg edema.    We are called to mx HTN and h/o s/p KTX  Regarding: HTN -   · Duration (when): acute   · Location HIV  UBALDO:  No results found for: ANATITER, UBALDO  RF:  No results found for: RF  DSDNA:  No components found for: DNA  AMYLASE:  No results found for: AMYLASE  LIPASE:    Lab Results   Component Value Date    LIPASE 235.0 12/23/2018     Fibrinogen Level:  No components found for: FIB       BELOW MENTIONED RADIOLOGY STUDY RESULTS BY ME:    Ct Head Wo Contrast    Result Date: 1/23/2020  EXAMINATION: CT OF THE HEAD WITHOUT CONTRAST  1/23/2020 2:11 pm TECHNIQUE: CT of the head was performed without the administration of intravenous contrast. Dose modulation, iterative reconstruction, and/or weight based adjustment of the mA/kV was utilized to reduce the radiation dose to as low as reasonably achievable. COMPARISON: 12/24/2019 HISTORY: ORDERING SYSTEM PROVIDED HISTORY: vomiting aphasia TECHNOLOGIST PROVIDED HISTORY: Has a \"code stroke\" or \"stroke alert\" been called? ->Yes Reason for exam:->vomiting aphasia FINDINGS: BRAIN/VENTRICLES: Ventricles are midline in position. No intracerebral masses are identified. No mass effect. No midline shift. No acute intracranial hemorrhage is seen. Patient is asymmetrically positioned within the scanner. There is intracranial atherosclerosis. .  Mild periventricular hypodensity is seen. Scattered additional areas of hypodensity are seen throughout the frontal and parietal white matter. ORBITS: The visualized portion of the orbits demonstrate no acute abnormality. SINUSES: Mild mucosal thickening is seen in the ethmoid air cells. No significant mastoid opacification noted. Mild mucosal thickening seen in the maxillary sinuses. There is bowing and spurring of the nasal septum SOFT TISSUES/SKULL:  No acute abnormality of the visualized skull or soft tissues. .  Scalp soft tissue calcifications are seen     No hemorrhage or mass identified Atrophy and small-vessel ischemic change, similar to prior. Mild paranasal sinus disease Results discussed with EZEQUIEL SIMS by Margareth Carreon.  Krystle Herrera MD

## 2020-01-25 LAB
ALBUMIN SERPL-MCNC: 2.8 G/DL (ref 3.4–5)
ANION GAP SERPL CALCULATED.3IONS-SCNC: 9 MMOL/L (ref 3–16)
BUN BLDV-MCNC: 22 MG/DL (ref 7–20)
CALCIUM SERPL-MCNC: 7.8 MG/DL (ref 8.3–10.6)
CHLORIDE BLD-SCNC: 104 MMOL/L (ref 99–110)
CO2: 27 MMOL/L (ref 21–32)
CREAT SERPL-MCNC: 1.5 MG/DL (ref 0.8–1.3)
GFR AFRICAN AMERICAN: 55
GFR NON-AFRICAN AMERICAN: 46
GLUCOSE BLD-MCNC: 125 MG/DL (ref 70–99)
GLUCOSE BLD-MCNC: 130 MG/DL (ref 70–99)
GLUCOSE BLD-MCNC: 143 MG/DL (ref 70–99)
GLUCOSE BLD-MCNC: 148 MG/DL (ref 70–99)
GLUCOSE BLD-MCNC: 150 MG/DL (ref 70–99)
GLUCOSE BLD-MCNC: 205 MG/DL (ref 70–99)
PERFORMED ON: ABNORMAL
PHOSPHORUS: 3.1 MG/DL (ref 2.5–4.9)
POTASSIUM SERPL-SCNC: 3.8 MMOL/L (ref 3.5–5.1)
SODIUM BLD-SCNC: 140 MMOL/L (ref 136–145)
TACROLIMUS BLOOD: 9.2 NG/ML (ref 5–20)

## 2020-01-25 PROCEDURE — 96376 TX/PRO/DX INJ SAME DRUG ADON: CPT

## 2020-01-25 PROCEDURE — 2580000003 HC RX 258: Performed by: INTERNAL MEDICINE

## 2020-01-25 PROCEDURE — 6370000000 HC RX 637 (ALT 250 FOR IP): Performed by: PSYCHIATRY & NEUROLOGY

## 2020-01-25 PROCEDURE — 6370000000 HC RX 637 (ALT 250 FOR IP): Performed by: HOSPITALIST

## 2020-01-25 PROCEDURE — 80069 RENAL FUNCTION PANEL: CPT

## 2020-01-25 PROCEDURE — 6370000000 HC RX 637 (ALT 250 FOR IP): Performed by: INTERNAL MEDICINE

## 2020-01-25 PROCEDURE — 96372 THER/PROPH/DIAG INJ SC/IM: CPT

## 2020-01-25 PROCEDURE — 2580000003 HC RX 258: Performed by: HOSPITALIST

## 2020-01-25 PROCEDURE — G0378 HOSPITAL OBSERVATION PER HR: HCPCS

## 2020-01-25 PROCEDURE — 80197 ASSAY OF TACROLIMUS: CPT

## 2020-01-25 PROCEDURE — 6360000002 HC RX W HCPCS: Performed by: HOSPITALIST

## 2020-01-25 PROCEDURE — C9113 INJ PANTOPRAZOLE SODIUM, VIA: HCPCS | Performed by: HOSPITALIST

## 2020-01-25 PROCEDURE — 99232 SBSQ HOSP IP/OBS MODERATE 35: CPT | Performed by: PSYCHIATRY & NEUROLOGY

## 2020-01-25 RX ORDER — SODIUM CHLORIDE 450 MG/100ML
INJECTION, SOLUTION INTRAVENOUS CONTINUOUS
Status: DISCONTINUED | OUTPATIENT
Start: 2020-01-25 | End: 2020-01-27 | Stop reason: HOSPADM

## 2020-01-25 RX ORDER — TACROLIMUS 1 MG/1
2 CAPSULE ORAL 2 TIMES DAILY
Status: DISCONTINUED | OUTPATIENT
Start: 2020-01-25 | End: 2020-01-25

## 2020-01-25 RX ORDER — TACROLIMUS 1 MG/1
2 CAPSULE ORAL 2 TIMES DAILY
Status: DISCONTINUED | OUTPATIENT
Start: 2020-01-25 | End: 2020-01-27 | Stop reason: HOSPADM

## 2020-01-25 RX ADMIN — INSULIN LISPRO 1 UNITS: 100 INJECTION, SOLUTION INTRAVENOUS; SUBCUTANEOUS at 18:54

## 2020-01-25 RX ADMIN — ASPIRIN 81 MG 81 MG: 81 TABLET ORAL at 09:16

## 2020-01-25 RX ADMIN — INSULIN LISPRO 2 UNITS: 100 INJECTION, SOLUTION INTRAVENOUS; SUBCUTANEOUS at 12:33

## 2020-01-25 RX ADMIN — Medication 10 ML: at 21:22

## 2020-01-25 RX ADMIN — INSULIN GLARGINE 43 UNITS: 100 INJECTION, SOLUTION SUBCUTANEOUS at 21:23

## 2020-01-25 RX ADMIN — TACROLIMUS 1 MG: 1 CAPSULE ORAL at 09:16

## 2020-01-25 RX ADMIN — SODIUM CHLORIDE: 4.5 INJECTION, SOLUTION INTRAVENOUS at 18:56

## 2020-01-25 RX ADMIN — MYCOPHENOLATE MOFETIL 750 MG: 250 CAPSULE ORAL at 09:16

## 2020-01-25 RX ADMIN — LEVETIRACETAM 500 MG: 500 TABLET ORAL at 09:16

## 2020-01-25 RX ADMIN — LEVETIRACETAM 500 MG: 500 TABLET ORAL at 21:19

## 2020-01-25 RX ADMIN — INSULIN LISPRO 5 UNITS: 100 INJECTION, SOLUTION INTRAVENOUS; SUBCUTANEOUS at 10:11

## 2020-01-25 RX ADMIN — AMLODIPINE BESYLATE 10 MG: 5 TABLET ORAL at 09:16

## 2020-01-25 RX ADMIN — INSULIN LISPRO 1 UNITS: 100 INJECTION, SOLUTION INTRAVENOUS; SUBCUTANEOUS at 21:24

## 2020-01-25 RX ADMIN — SODIUM CHLORIDE: 9 INJECTION, SOLUTION INTRAVENOUS at 01:49

## 2020-01-25 RX ADMIN — CLOPIDOGREL 75 MG: 75 TABLET, FILM COATED ORAL at 09:16

## 2020-01-25 RX ADMIN — TACROLIMUS 2 MG: 1 CAPSULE ORAL at 21:20

## 2020-01-25 RX ADMIN — MYCOPHENOLATE MOFETIL 750 MG: 250 CAPSULE ORAL at 21:19

## 2020-01-25 RX ADMIN — Medication 10 ML: at 09:17

## 2020-01-25 RX ADMIN — INSULIN LISPRO 5 UNITS: 100 INJECTION, SOLUTION INTRAVENOUS; SUBCUTANEOUS at 18:54

## 2020-01-25 RX ADMIN — DESMOPRESSIN ACETATE 40 MG: 0.2 TABLET ORAL at 21:19

## 2020-01-25 RX ADMIN — ENOXAPARIN SODIUM 40 MG: 40 INJECTION SUBCUTANEOUS at 09:16

## 2020-01-25 RX ADMIN — PANTOPRAZOLE SODIUM 40 MG: 40 INJECTION, POWDER, LYOPHILIZED, FOR SOLUTION INTRAVENOUS at 09:16

## 2020-01-25 RX ADMIN — INSULIN LISPRO 5 UNITS: 100 INJECTION, SOLUTION INTRAVENOUS; SUBCUTANEOUS at 12:36

## 2020-01-25 ASSESSMENT — PAIN SCALES - GENERAL: PAINLEVEL_OUTOF10: 0

## 2020-01-25 NOTE — PLAN OF CARE
Problem: Pain:  Goal: Pain level will decrease  Description  Pain level will decrease  1/24/2020 2209 by Margie Page RN  Outcome: Ongoing  Note:   No c/o pain at thsi time     Problem: Falls - Risk of:  Goal: Will remain free from falls  Description  Will remain free from falls  1/24/2020 2209 by Margie Page RN  Outcome: Ongoing  Note:   HIGH fall risk. SBA with ambulation. SAFE to door, arm band in place, fall blanket on bed, side rails up x2, bed in lowest position and wheels locked. Callbell in reach, bed alarm on and video monitor in use. Problem: Falls - Risk of:  Goal: Absence of physical injury  Description  Absence of physical injury  Outcome: Ongoing     Problem: Serum Glucose Level - Abnormal:  Intervention: Monitor blood glucose measurement  Note:   Blood glucose 93 - pt given HS snack and juice. Problem: HEMODYNAMIC STATUS  Goal: Patient has stable vital signs and fluid balance  1/24/2020 2209 by Margie Page RN  Outcome: Ongoing     Problem: ACTIVITY INTOLERANCE/IMPAIRED MOBILITY  Goal: Mobility/activity is maintained at optimum level for patient  Outcome: Ongoing     Problem: COMMUNICATION IMPAIRMENT  Goal: Ability to express needs and understand communication  Outcome: Ongoing  Note:   Continues difficulty with speech at times.   NIHSS 1

## 2020-01-25 NOTE — PROGRESS NOTES
Blood glucose 93 - pt was reacting slow, gave pudding/juice and ernst crackers, back to self. Blood gluocose to be rechecked, see results review. Vitals stable and HS meds given. NIHSS 1.   Callbell in reach, bed alarm on and video monitor in use.  nasreen Muller

## 2020-01-25 NOTE — PROGRESS NOTES
JVD not visible. No lymph nodes palpable. CVS.  Heart sounds are normal.Palpation of the heart is normal. No murmurs. No pericardial rub.  RS.dullness on percussion of the lower chest wall. Bilateral Basal rales. PA soft , bowel sounds are normal no distension and no tenderness to palpation. Skin No rash , No palpable nodules  Musculoskeletal: Normal range of motion. 1+ edema and no tenderness. CNS  No focal    Edematrace  Awake and alert  All pulses are well felt      Active Problems:    Aphasia  Resolved Problems:    * No resolved hospital problems. *        Medications Reviewed by me   amLODIPine  10 mg Oral Daily    levETIRAcetam  500 mg Oral BID    aspirin  81 mg Oral Daily    atorvastatin  40 mg Oral Nightly    clopidogrel  75 mg Oral Daily    insulin lispro (1 Unit Dial)  5 Units Subcutaneous TID WC    insulin glargine  43 Units Subcutaneous Nightly    sodium chloride flush  10 mL Intravenous 2 times per day    enoxaparin  40 mg Subcutaneous Daily    aspirin  300 mg Rectal Daily    insulin lispro  0-6 Units Subcutaneous TID WC    insulin lispro  0-3 Units Subcutaneous Nightly    pantoprazole  40 mg Intravenous Daily    tacrolimus  1 mg Oral BID    mycophenolate  750 mg Oral BID      sodium chloride 75 mL/hr at 01/25/20 0149    dextrose         Data Review. Labs reviewed by me       CBC:   Recent Labs     01/23/20  1407 01/24/20  0458   WBC 7.3 11.7*   HGB 14.2 14.0   HCT 42.3 41.8   MCV 85.9 85.1    212     BMP:   Recent Labs     01/23/20  1407 01/24/20  0458 01/25/20  0515    139 140   K 4.3 4.1 3.8   CL 98* 101 104   CO2 27 25 27   PHOS  --  3.5 3.1   BUN 20 24* 22*   CREATININE 1.4* 1.7* 1.5*     Magnesium: No results found for: MG  Lab Results   Component Value Date    CREATININE 1.5 01/25/2020       Arterial Blood Gasses  No results for input(s): PH, PCO2, PO2 in the last 72 hours.     Invalid input(s): Christy Mcdowell    UA:  Recent Labs     01/23/20  1458 COLORU YELLOW   PHUR 7.5   WBCUA 2   RBCUA 15*   CLARITYU Clear   SPECGRAV 1.025   LEUKOCYTESUR Negative   UROBILINOGEN 0.2   BILIRUBINUR Negative   BLOODU SMALL*   GLUCOSEU 250*       LIVER PROFILE:   Recent Labs     01/23/20  1407   AST 11*   ALT 12   BILITOT 0.7   ALKPHOS 72     PT/INR:    Lab Results   Component Value Date    PROTIME 11.3 01/23/2020    PROTIME 11.2 12/23/2019    PROTIME 11.5 12/23/2018    INR 0.97 01/23/2020    INR 0.97 12/23/2019    INR 1.01 12/23/2018     PTT:    Lab Results   Component Value Date    APTT 30.7 12/23/2018     UBALDO:  No results found for: ANATITER, UBALDO  CHEMISTRY COMMON GROUP :   Lab Results   Component Value Date    GLUCOSE 130 01/25/2020     Recent Labs     01/23/20  1407 01/24/20  0458 01/25/20  0515   GLUCOSE 243* 193* 130*   CALCIUM 9.4 9.1 7.8*         RADIOLOGY:        Imaging Results. Chest X Ray reviwed by me    Chest Xray Reviewed by me  Renal Ultrasound Reviewed by me    EKG reviewed by me.                 Electronically Signed: Kvng Glass MD 1/25/2020 9:59 AM

## 2020-01-25 NOTE — PROGRESS NOTES
Pt HR dropped in the upper 30's for a few seconds and then back up into the 60's. Went to check on pt, easily aroused from sleep, denied any pain/discomfort, no SOB - stated he was \"fine\". Callbell in reach, bed alarm on and video monitor in use.   Jonah Anderson RN

## 2020-01-25 NOTE — PROGRESS NOTES
Pt resting in bed with no symptoms/signs of pain or distress at this time. Pt has no concerns or complaints. Tele in place with VSS with elevated BP. No change from previous assessment. Will continue to monitor.

## 2020-01-25 NOTE — PROGRESS NOTES
[facility] PROGRESS NOTE    1/25/2020 6:28 PM        Name: Christa Venegas . Admitted: 1/23/2020  Primary Care Provider: No primary care provider on file. (Tel: None)      Subjective: Azucena Medeiros     Pt admitted with hypertensive emergency with aphasia and renal failure  MRI r/out acute CVA  BP is trending down  No new complaints    Reviewed interval ancillary notes    Current Medications  tacrolimus (PROGRAF) capsule 2 mg - Patient Supplied , BID  amLODIPine (NORVASC) tablet 10 mg, Daily  levETIRAcetam (KEPPRA) tablet 500 mg, BID  aspirin chewable tablet 81 mg, Daily  atorvastatin (LIPITOR) tablet 40 mg, Nightly  clopidogrel (PLAVIX) tablet 75 mg, Daily  insulin lispro (1 Unit Dial) 5 Units, TID WC  insulin glargine (LANTUS) injection pen 43 Units, Nightly  sodium chloride flush 0.9 % injection 10 mL, 2 times per day  sodium chloride flush 0.9 % injection 10 mL, PRN  magnesium hydroxide (MILK OF MAGNESIA) 400 MG/5ML suspension 30 mL, Daily PRN  ondansetron (ZOFRAN) injection 4 mg, Q6H PRN  enoxaparin (LOVENOX) injection 40 mg, Daily  aspirin suppository 300 mg, Daily  labetalol (NORMODYNE;TRANDATE) injection 10 mg, Q6H PRN  hydrALAZINE (APRESOLINE) injection 10 mg, Q6H PRN  glucose (GLUTOSE) 40 % oral gel 15 g, PRN  dextrose 50 % IV solution, PRN  glucagon (rDNA) injection 1 mg, PRN  dextrose 5 % solution, PRN  insulin lispro (1 Unit Dial) 0-6 Units, TID WC  insulin lispro (1 Unit Dial) 0-3 Units, Nightly  pantoprazole (PROTONIX) injection 40 mg, Daily  prochlorperazine (COMPAZINE) injection 10 mg, Q6H PRN  ondansetron (ZOFRAN) injection 4 mg, Q6H PRN  mycophenolate (CELLCEPT) capsule 1,000 mg - PATIENT SUPPLIED, BID        Objective:  BP (!) 155/73   Pulse 56   Temp 97.2 °F (36.2 °C) (Oral)   Resp 16   Ht 6' (1.829 m)   Wt 193 lb 11.2 oz (87.9 kg)   SpO2 98%   BMI 26.27 kg/m²     Intake/Output Summary (Last 24 hours) at 1/25/2020 1828  Last data filed at 1/25/2020 0759  Gross per 24 hour   Intake --   Output 350 ml   Net -350 ml      Wt Readings from Last 3 Encounters:   01/25/20 193 lb 11.2 oz (87.9 kg)   12/25/19 198 lb 3.1 oz (89.9 kg)   12/24/18 200 lb 12.8 oz (91.1 kg)       General appearance:  Appears comfortable  Eyes: Sclera clear. Pupils equal.  ENT: Moist oral mucosa. Trachea midline, no adenopathy. Cardiovascular: Regular rhythm, normal S1, S2. No murmur. No edema in lower extremities  Respiratory: Not using accessory muscles. Good inspiratory effort. Clear to auscultation bilaterally, no wheeze or crackles. GI: Abdomen soft, no tenderness, not distended, normal bowel sounds  Musculoskeletal: No cyanosis in digits, neck supple  Neurology: CN 2-12 grossly intact. No speech or motor deficits  Psych: Normal affect. Alert and oriented in time, place and person  Skin: Warm, dry, normal turgor    Labs and Tests:  CBC:   Recent Labs     01/23/20  1407 01/24/20  0458   WBC 7.3 11.7*   HGB 14.2 14.0    212     BMP:    Recent Labs     01/23/20  1407 01/24/20  0458 01/25/20  0515    139 140   K 4.3 4.1 3.8   CL 98* 101 104   CO2 27 25 27   BUN 20 24* 22*   CREATININE 1.4* 1.7* 1.5*   GLUCOSE 243* 193* 130*     Hepatic:   Recent Labs     01/23/20  1407   AST 11*   ALT 12   BILITOT 0.7   ALKPHOS 72           Problem List  Active Problems:    Aphasia  Resolved Problems:    * No resolved hospital problems. *       Assessment & Plan:   1.  Hypertensive urgency neurological sx  -With presenting blood pressure of 226 most likely cause of contributed to strokelike symptoms  MRI is neg for acute CVA  BP is trending down  Cont ASA and plavix  Appreciate neurology recommendation  Cont Norvasc and prn hydralazine    MATTHEW cont fluids  Cr trending down from 1.7 to 1.5  Appreciate nephrology reccomendation      Diet: DIET CARB CONTROL;  Code:Full Code  DVT PPX      Rosa Isela Erickson MD   1/25/2020 6:28 PM

## 2020-01-25 NOTE — PROGRESS NOTES
Occupational Therapy  Attempted at [phone] however pt had family present and wished for OT to return. Discussed trying OT in ~1 hour. Attempted at [phone] and pt sleeping and did refusing at this time. Will attempt again at a later date when OT schedule allows and pt. Agreeable.   Amanda Hand OTR/L

## 2020-01-25 NOTE — PROGRESS NOTES
NEUROLOGY FOLLOWUP    HISTORY OF PRESENT ILLNESS :     Miguelina Guillen is a 69 y.o. male   History was obtained from the patient and dictation to the chart. Patient speech seems to be improving and is almost back to baseline today. Patient was admitted with recurrent diffuse episodes of aphasia. Patient has had 4 such episodes in the last 18 months. Initially these were felt to be TIA/stroke but after the last episode seizure was considered. Patient was started on Keppra. EEG was normal but this was not interictal EEG. Patient has had previous kidney transplant.     REVIEW OF SYSTEMS       Constitutional:  []   Chills   [x]  Fatigue   []  Fevers   []  Malaise   []  Weight loss     [] Denies all of the above    Respiratory:   []  Cough    []  Shortness of breath         [x] Denies all of the above     Cardiovascular:   []  Chest pain    []  Exertional chest pressure/discomfort           [] Palpitations    []  Syncope     [x] Denies all of the above    Past Medical History:   Diagnosis Date    Arthritis     [facility] fibrillation ([facility]     Cancer ([facility]     Diabetes mellitus ([facility]     History of blood transfusion     Hyperlipidemia     Hypertension      Family History   Problem Relation Age of Onset    Arthritis Mother     Arrhythmia Mother     Diabetes Mother     High Blood Pressure Mother     Obesity Mother     High Blood Pressure Father     Obesity Father     Allergy (Severe) Child     Asthma Child     Obesity Child      Social History     Socioeconomic History    Marital status:      Spouse name: None    Number of children: None    Years of education: None    Highest education level: None   Occupational History    Occupation: retired   Social Needs    Financial resource strain: None    Food insecurity:     Worry: None     Inability: None    Transportation needs:     Medical: None Non-medical: None   Tobacco Use    Smoking status: Former Smoker     Packs/day: 1.00     Years: 33.00     Pack years: 33.00     Types: Cigarettes     Start date:      Last attempt to quit:      Years since quittin.0    Smokeless tobacco: Never Used   Substance and Sexual Activity    Alcohol use: No    Drug use: No    Sexual activity: Yes     Partners: Female   Lifestyle    Physical activity:     Days per week: None     Minutes per session: None    Stress: None   Relationships    Social connections:     Talks on phone: None     Gets together: None     Attends Hindu service: None     Active member of club or organization: None     Attends meetings of clubs or organizations: None     Relationship status: None    Intimate partner violence:     Fear of current or ex partner: None     Emotionally abused: None     Physically abused: None     Forced sexual activity: None   Other Topics Concern    None   Social History Narrative    None        PHYSICAL EXAMINATION      /67   Pulse 56   Temp 97.4 °F (36.3 °C) (Oral)   Resp 16   Ht 6' (1.829 m)   Wt 193 lb 11.2 oz (87.9 kg)   SpO2 99%   BMI 26.27 kg/m²   This is a well-nourished patient in no acute distress  Awake alert and oriented x3. Speech improved. Aphasia improved. Pupils equal round reacting to light. Visual fields full. External ocular movements intact. Face symmetrical.  Tongue midline. Strength is 4+/5 all over. Plantar reflexes withdrawal.  Sensory exam shows distal neuropathy. Coordination normal.  Gait unsteady. No carotid bruit. No neck stiffness.   DATA :  LABS:  General Labs:    CBC:   Lab Results   Component Value Date    WBC 11.7 2020    RBC 4.91 2020    HGB 14.0 2020    HCT 41.8 2020    MCV 85.1 2020    MCH 28.5 2020    MCHC 33.5 2020    RDW 16.0 2020     2020    MPV 8.1 2020     BMP:    Lab Results   Component Value Date     2020

## 2020-01-25 NOTE — PROGRESS NOTES
No changes. Vitals stable. Callbell in reach, bed alarm and video monitor in use.   Maricel Palacios Rn

## 2020-01-26 PROBLEM — R47.01 EXPRESSIVE APHASIA: Status: ACTIVE | Noted: 2018-12-23

## 2020-01-26 LAB
ALBUMIN SERPL-MCNC: 3 G/DL (ref 3.4–5)
ANION GAP SERPL CALCULATED.3IONS-SCNC: 10 MMOL/L (ref 3–16)
BUN BLDV-MCNC: 19 MG/DL (ref 7–20)
CALCIUM SERPL-MCNC: 8 MG/DL (ref 8.3–10.6)
CHLORIDE BLD-SCNC: 105 MMOL/L (ref 99–110)
CO2: 24 MMOL/L (ref 21–32)
CREAT SERPL-MCNC: 1.4 MG/DL (ref 0.8–1.3)
GFR AFRICAN AMERICAN: 60
GFR NON-AFRICAN AMERICAN: 50
GLUCOSE BLD-MCNC: 102 MG/DL (ref 70–99)
GLUCOSE BLD-MCNC: 106 MG/DL (ref 70–99)
GLUCOSE BLD-MCNC: 132 MG/DL (ref 70–99)
GLUCOSE BLD-MCNC: 145 MG/DL (ref 70–99)
GLUCOSE BLD-MCNC: 202 MG/DL (ref 70–99)
PERFORMED ON: ABNORMAL
PHOSPHORUS: 2.6 MG/DL (ref 2.5–4.9)
POTASSIUM SERPL-SCNC: 3.7 MMOL/L (ref 3.5–5.1)
SODIUM BLD-SCNC: 139 MMOL/L (ref 136–145)

## 2020-01-26 PROCEDURE — 97535 SELF CARE MNGMENT TRAINING: CPT

## 2020-01-26 PROCEDURE — 36415 COLL VENOUS BLD VENIPUNCTURE: CPT

## 2020-01-26 PROCEDURE — 2580000003 HC RX 258: Performed by: HOSPITALIST

## 2020-01-26 PROCEDURE — 2500000003 HC RX 250 WO HCPCS: Performed by: HOSPITALIST

## 2020-01-26 PROCEDURE — C9113 INJ PANTOPRAZOLE SODIUM, VIA: HCPCS | Performed by: HOSPITALIST

## 2020-01-26 PROCEDURE — 6360000002 HC RX W HCPCS: Performed by: HOSPITALIST

## 2020-01-26 PROCEDURE — 6370000000 HC RX 637 (ALT 250 FOR IP): Performed by: INTERNAL MEDICINE

## 2020-01-26 PROCEDURE — 97116 GAIT TRAINING THERAPY: CPT

## 2020-01-26 PROCEDURE — 80069 RENAL FUNCTION PANEL: CPT

## 2020-01-26 PROCEDURE — 96372 THER/PROPH/DIAG INJ SC/IM: CPT

## 2020-01-26 PROCEDURE — 6370000000 HC RX 637 (ALT 250 FOR IP): Performed by: PSYCHIATRY & NEUROLOGY

## 2020-01-26 PROCEDURE — 97165 OT EVAL LOW COMPLEX 30 MIN: CPT

## 2020-01-26 PROCEDURE — 1200000000 HC SEMI PRIVATE

## 2020-01-26 PROCEDURE — 96376 TX/PRO/DX INJ SAME DRUG ADON: CPT

## 2020-01-26 PROCEDURE — 2580000003 HC RX 258: Performed by: INTERNAL MEDICINE

## 2020-01-26 PROCEDURE — 97530 THERAPEUTIC ACTIVITIES: CPT

## 2020-01-26 PROCEDURE — 6370000000 HC RX 637 (ALT 250 FOR IP): Performed by: HOSPITALIST

## 2020-01-26 PROCEDURE — 96375 TX/PRO/DX INJ NEW DRUG ADDON: CPT

## 2020-01-26 RX ORDER — LOSARTAN POTASSIUM 100 MG/1
100 TABLET ORAL DAILY
Status: DISCONTINUED | OUTPATIENT
Start: 2020-01-26 | End: 2020-01-27 | Stop reason: HOSPADM

## 2020-01-26 RX ORDER — LABETALOL HYDROCHLORIDE 5 MG/ML
10 INJECTION, SOLUTION INTRAVENOUS EVERY 6 HOURS PRN
Status: DISCONTINUED | OUTPATIENT
Start: 2020-01-26 | End: 2020-01-27 | Stop reason: HOSPADM

## 2020-01-26 RX ORDER — HYDRALAZINE HYDROCHLORIDE 20 MG/ML
10 INJECTION INTRAMUSCULAR; INTRAVENOUS EVERY 6 HOURS PRN
Status: DISCONTINUED | OUTPATIENT
Start: 2020-01-26 | End: 2020-01-27 | Stop reason: HOSPADM

## 2020-01-26 RX ADMIN — TACROLIMUS 2 MG: 1 CAPSULE ORAL at 10:00

## 2020-01-26 RX ADMIN — ENOXAPARIN SODIUM 40 MG: 40 INJECTION SUBCUTANEOUS at 08:52

## 2020-01-26 RX ADMIN — Medication 10 ML: at 08:52

## 2020-01-26 RX ADMIN — CLOPIDOGREL 75 MG: 75 TABLET, FILM COATED ORAL at 09:59

## 2020-01-26 RX ADMIN — LOSARTAN POTASSIUM 100 MG: 100 TABLET, FILM COATED ORAL at 20:40

## 2020-01-26 RX ADMIN — LABETALOL HYDROCHLORIDE 10 MG: 5 INJECTION INTRAVENOUS at 08:51

## 2020-01-26 RX ADMIN — INSULIN LISPRO 1 UNITS: 100 INJECTION, SOLUTION INTRAVENOUS; SUBCUTANEOUS at 17:10

## 2020-01-26 RX ADMIN — ASPIRIN 81 MG 81 MG: 81 TABLET ORAL at 09:59

## 2020-01-26 RX ADMIN — TACROLIMUS 2 MG: 1 CAPSULE ORAL at 20:40

## 2020-01-26 RX ADMIN — HYDRALAZINE HYDROCHLORIDE 10 MG: 20 INJECTION INTRAMUSCULAR; INTRAVENOUS at 04:22

## 2020-01-26 RX ADMIN — PANTOPRAZOLE SODIUM 40 MG: 40 INJECTION, POWDER, LYOPHILIZED, FOR SOLUTION INTRAVENOUS at 08:51

## 2020-01-26 RX ADMIN — INSULIN LISPRO 5 UNITS: 100 INJECTION, SOLUTION INTRAVENOUS; SUBCUTANEOUS at 12:09

## 2020-01-26 RX ADMIN — INSULIN LISPRO 5 UNITS: 100 INJECTION, SOLUTION INTRAVENOUS; SUBCUTANEOUS at 17:09

## 2020-01-26 RX ADMIN — LEVETIRACETAM 500 MG: 500 TABLET ORAL at 20:40

## 2020-01-26 RX ADMIN — MYCOPHENOLATE MOFETIL 750 MG: 250 CAPSULE ORAL at 20:40

## 2020-01-26 RX ADMIN — DESMOPRESSIN ACETATE 40 MG: 0.2 TABLET ORAL at 20:40

## 2020-01-26 RX ADMIN — Medication 10 ML: at 20:42

## 2020-01-26 RX ADMIN — INSULIN GLARGINE 43 UNITS: 100 INJECTION, SOLUTION SUBCUTANEOUS at 20:50

## 2020-01-26 RX ADMIN — MYCOPHENOLATE MOFETIL 750 MG: 250 CAPSULE ORAL at 10:00

## 2020-01-26 RX ADMIN — AMLODIPINE BESYLATE 10 MG: 5 TABLET ORAL at 09:59

## 2020-01-26 RX ADMIN — ONDANSETRON 4 MG: 2 INJECTION INTRAMUSCULAR; INTRAVENOUS at 08:30

## 2020-01-26 RX ADMIN — SODIUM CHLORIDE: 4.5 INJECTION, SOLUTION INTRAVENOUS at 10:09

## 2020-01-26 RX ADMIN — INSULIN LISPRO 1 UNITS: 100 INJECTION, SOLUTION INTRAVENOUS; SUBCUTANEOUS at 20:50

## 2020-01-26 RX ADMIN — LEVETIRACETAM 500 MG: 500 TABLET ORAL at 10:02

## 2020-01-26 ASSESSMENT — PAIN SCALES - GENERAL
PAINLEVEL_OUTOF10: 0

## 2020-01-26 NOTE — PROGRESS NOTES
Bed alarm sounding, did not call for assistance. Assisted to BR for void, pull up changed, small clot noted. Returned to bed, /83, other VSS. PRN IV hydralazine administered, no other needs voiced.

## 2020-01-26 NOTE — PROGRESS NOTES
To room for assessment, patient states need to void. Wants to walk into BR and go in toilet, advised he needs to use his urinal for I&O. He became aggitated, says nobody else has made him do that, now we have a problem and threatened to leave. Advised it is important to monitor his output as he is getting IV fluids, and we want to make sure he is not retaining fluid. Assisted to stand @bedside, brief pulled down and long stringy clot pulled out of urinary meatus, brief soiled with blood. Asha care provided, brief changed. Only voided 20 ml of pink urine but felt like he was \"going and going\", reports dysuria but denies bladder discomfort, abdomen is rounded and soft, no bladder distention appreciated. Advised it is imperative he continue to use urinal so we can monitor his output, explained since he is still obviously bleeding from the trauma of pulling out his kidd 2 days ago, there is a chance those blood clots could block the opening of his bladder or his urethra, not allowing the urine to flow and causing urinary retention. Explained a bladder scan and advised him to notify me if he feels any bladder discomfort and we will check for retention. VSS, meds administered, denies other needs @present.

## 2020-01-26 NOTE — PROGRESS NOTES
Mobility Training  Barriers to Learning: cognition  REQUIRES PT FOLLOW UP: Yes  Activity Tolerance  Activity Tolerance: Patient Tolerated treatment well     Patient Diagnosis(es): The encounter diagnosis was Aphasia. has a past medical history of Arthritis, Atrial fibrillation ([facility] Cancer ([facility] Diabetes mellitus ([facility] History of blood transfusion, Hyperlipidemia, and Hypertension. has a past surgical history that includes eye surgery and joint replacement. Restrictions  Restrictions/Precautions  Restrictions/Precautions: Fall Risk  Required Braces or Orthoses?: No  Position Activity Restriction  Other position/activity restrictions: Patient admitted w/ aphasia, which is improving. Brain CT and MRI negative for acute infarct. Patient here in December for same symptoms and given TPA. No residual effects per patient and spouse. Patient also had similar event in December 2018. Subjective   General  Chart Reviewed: Yes  Additional Pertinent Hx: AFIB, OA, DM, HTN, cancer, B TKA, kidney transplant (2008), ESRD  Response To Previous Treatment: Patient with no complaints from previous session. Family / Caregiver Present: No  Subjective  Subjective: \"When do I get to go home? \"  General Comment  Comments: Patient supine in bed. Pain Screening  Patient Currently in Pain: Denies  Vital Signs  Patient Currently in Pain: Denies       Orientation  Orientation  Overall Orientation Status: Within Normal Limits     Objective   Bed mobility  Rolling to Right: Independent  Supine to Sit: Independent  Sit to Supine: Independent  Scooting: Independent  Transfers  Sit to Stand: Independent  Stand to sit: Independent  Bed to Chair: Independent  Stand Pivot Transfers: Independent  Ambulation  Ambulation?: Yes  Ambulation 1  Surface: level tile  Device: No Device  Assistance: Contact guard assistance  Quality of Gait: Fair. Scissoring gait at times, LOB x2 but able to correct. Fall risk.   Distance: 150'  Stairs/Curb  Stairs?: No     Balance  Posture: Good  Sitting - Static: Good  Sitting - Dynamic: Good  Standing - Static: Good  Standing - Dynamic: Fair(unsteady gait)      AROM RLE (degrees)  RLE AROM: WNL  AROM LLE (degrees)  LLE AROM : WNL  Strength RLE  Strength RLE: WFL  Strength LLE  Strength LLE: [facility]    AM-PAC Score  AM-PAC Inpatient Mobility Raw Score : 22 (01/26/20 1524)  AM-PAC Inpatient T-Scale Score : 53.28 (01/26/20 1524)  Mobility Inpatient CMS 0-100% Score: 20.91 (01/26/20 1524)  Mobility Inpatient CMS G-Code Modifier : CJ (01/26/20 1524)    Goals  Short term goals  Time Frame for Short term goals: Discharge. Short term goal 1: Patient to perform bed-chair transfers independently w/o a device. (GOAL MET 1.26.2020)  Short term goal 2: Patient to ambulate 150' independently w/LRAD. (Progressing, revised)  Patient Goals   Patient goals : Return home. Walk independently/safely. Plan    Plan  Times per week: 5-7  Times per day: Daily  Current Treatment Recommendations: Strengthening, Transfer Training, Endurance Training, Neuromuscular Re-education, Cognitive Reorientation, Patient/Caregiver Education & Training, Equipment Evaluation, Education, & procurement, Home Exercise Program, Gait Training, Balance Training, Functional Mobility Training, Safety Education & Training  Safety Devices  Type of devices:  All fall risk precautions in place, Bed alarm in place, Call light within reach, Gait belt, Left in bed, Telesitter in use, Nurse notified  Restraints  Initially in place: No     Therapy Time   Individual Concurrent Group Co-treatment   Time In 1239         Time Out 1318         Minutes 39         Timed Code Treatment Minutes: [address], [place], [place]C-R [id number]

## 2020-01-26 NOTE — PLAN OF CARE
Problem: Pain:  Goal: Pain level will decrease  Description  Pain level will decrease  Outcome: Ongoing  Note:   Denies pain except with urination this shift. Problem: Falls - Risk of:  Goal: Will remain free from falls  Description  Will remain free from falls  Outcome: Ongoing  Note:   Remains free from falls this shift. Problem: HEMODYNAMIC STATUS  Goal: Patient has stable vital signs and fluid balance  Outcome: Ongoing  Note:   Hypertensive this shift, PRN Hydralazine administered x1, remains Afib w/SVR on telemetry. Problem: ACTIVITY INTOLERANCE/IMPAIRED MOBILITY  Goal: Mobility/activity is maintained at optimum level for patient  Outcome: Ongoing  Note:   Remains unsteady, CGA with all OOB activity. Problem: COMMUNICATION IMPAIRMENT  Goal: Ability to express needs and understand communication  Outcome: Ongoing  Note:   No aphasia noted this shift.

## 2020-01-26 NOTE — PROGRESS NOTES
Assisted to stand @bedside for void, another long stringy clot in brief and soiled with blood. Voided 225 ml pink urine, assisted to BR for BM, none produced. Bed pad and bottom sheet changed while on toilet, clinton care provided and brief changed before returning to bed. /85 after activity, will hold off on PRN hydralazine for now. Other VSS, denies other needs @present.

## 2020-01-26 NOTE — PROGRESS NOTES
Occupational Therapy   Occupational Therapy Initial Assessment  Date: 2020   Patient Name: Ladona Seip  MRN: [id number]     : 1945    Date of Service: 2020    Discharge Recommendations:  Ladona Seip scored a 19/24 on the AM-PAC ADL Inpatient form. Current research shows that an AM-PAC score of 18 or greater is typically associated with a discharge to the patient's home setting. Based on the patients AM-PAC score and their current ADL deficits, it is recommended that the patient have 2-3 sessions per week of Occupational Therapy at d/c to increase the patients independence. HOME HEALTH CARE: LEVEL 3 SAFETY     - Initial home health evaluation to occur within 24-48 hours, in patient home   - Therapy evaluations in home within 24-48 hours of discharge; including DME and home safety   - Frontload therapy 5 days, then 3x a week   - Therapy to evaluate if patient has [address] needs for personal care   -  evaluation within 24-48 hours, includes evaluation of resources and insurance to determine [place], [place], LTC, and Medicaid options      OT Equipment Recommendations  Equipment Needed: Yes  Mobility Devices: ADL Assistive Devices  ADL Assistive Devices: Shower Chair without back    Assessment   Performance deficits / Impairments: Decreased functional mobility ; Decreased ADL status; Decreased safe awareness;Decreased high-level IADLs;Decreased cognition;Decreased balance  Treatment Diagnosis: decreased independence with ADL due to late affects of repeat hypertensive episodes and probable seizures.    Prognosis: Good  Decision Making: Low Complexity  Exam: ADL, mobility, transfers, vision, cognition  Assistance / Modification: physical assistnace  OT Education: OT Role;Plan of Care;Transfer Training;Precautions;IADL Safety  Patient Education: discharge recommendation and findings  Barriers to Learning: cognition  REQUIRES OT FOLLOW UP: Yes  Activity Tolerance  Activity Tolerance:

## 2020-01-26 NOTE — PROGRESS NOTES
MD Jez Krause MD Julie Slate, MD                                  Office: [phone]                 Fax: [phone]          [facility]                     NEPHROLOGY IN PATIENT PROGRESS NOTE:     PATIENT NAME: Tigist Diaz  : 1945  MRN: [id number]            Subjective:       Doing better. Blood pressure improved. Good urine output. Assessment and Plan    Assessment:     Acute kidney injury-slow improvement-nonoliguric. Uncontrolled hypertension. Possible TIA. Kidney transplant-living related-daughter-. Chronic kidney disease in the transplant kidney with a baseline creatinine of 1.3. Renal carcinoma in the transplant kidney 2019 with partial nephrectomy. Plan:       Creatinine is 1.4. On gentle IV hydration    Blood pressure is improved. Immunosuppressive medications reviewed with the patient family. Patient is bringing medications from home. Patient must take same dosage and medication. CellCept 750 mg 1 tablet twice daily. Prograf 2 mg twice daily. Continue to monitor blood pressure control. Check daily labs. EXAM  Vitals:    20 1129   BP: (!) 148/70   Pulse: 71   Resp: 16   Temp:    SpO2: 98%       Intake/Output Summary (Last 24 hours) at 2020 1155  Last data filed at 2020 0843  Gross per 24 hour   Intake 788.4 ml   Output 1545 ml   Net -756.6 ml         External exam of the ears and nose are normal  HENT: exam is normal  Eyes: Pupils are equal, round, and reactive to light. Lymph Nodes. No axillary or cervical lymph nodes are palpable. Neck. JVD not visible. No lymph nodes palpable. CVS.  Heart sounds are normal.Palpation of the heart is normal. No murmurs. No pericardial rub.  RS.dullness on percussion of the lower chest wall. Bilateral Basal rales. PA soft , bowel sounds are normal no distension and no tenderness to palpation.   Skin No rash , No palpable nodules  Musculoskeletal: Normal range of motion. 1+ edema and no tenderness. CNS  No focal         Active Problems:    Aphasia  Resolved Problems:    * No resolved hospital problems. *        Medications Reviewed by me   tacrolimus  2 mg Oral BID    amLODIPine  10 mg Oral Daily    levETIRAcetam  500 mg Oral BID    aspirin  81 mg Oral Daily    atorvastatin  40 mg Oral Nightly    clopidogrel  75 mg Oral Daily    insulin lispro (1 Unit Dial)  5 Units Subcutaneous TID WC    insulin glargine  43 Units Subcutaneous Nightly    sodium chloride flush  10 mL Intravenous 2 times per day    enoxaparin  40 mg Subcutaneous Daily    aspirin  300 mg Rectal Daily    insulin lispro  0-6 Units Subcutaneous TID WC    insulin lispro  0-3 Units Subcutaneous Nightly    pantoprazole  40 mg Intravenous Daily    mycophenolate  750 mg Oral BID      sodium chloride 75 mL/hr at 01/26/20 1009    dextrose         Data Review. Labs reviewed by me       CBC:   Recent Labs     01/23/20  1407 01/24/20  0458   WBC 7.3 11.7*   HGB 14.2 14.0   HCT 42.3 41.8   MCV 85.9 85.1    212     BMP:   Recent Labs     01/24/20  0458 01/25/20  0515 01/26/20  0459    140 139   K 4.1 3.8 3.7    104 105   CO2 25 27 24   PHOS 3.5 3.1 2.6   BUN 24* 22* 19   CREATININE 1.7* 1.5* 1.4*     Magnesium: No results found for: MG  Lab Results   Component Value Date    CREATININE 1.4 01/26/2020       Arterial Blood Gasses  No results for input(s): PH, PCO2, PO2 in the last 72 hours.     Invalid input(s): A1JCPLFWWCRG, INSPIREDO2    UA:  Recent Labs     01/23/20  1452   COLORU YELLOW   PHUR 7.5   WBCUA 2   RBCUA 15*   CLARITYU Clear   SPECGRAV 1.025   LEUKOCYTESUR Negative   UROBILINOGEN 0.2   BILIRUBINUR Negative   BLOODU SMALL*   GLUCOSEU 250*       LIVER PROFILE:   Recent Labs     01/23/20  1407   AST 11*   ALT 12   BILITOT 0.7   ALKPHOS 72     PT/INR:    Lab Results   Component Value Date    PROTIME 11.3 01/23/2020    PROTIME 11.2 12/23/2019    PROTIME 11.5 12/23/2018    INR 0.97 01/23/2020    INR 0.97 12/23/2019    INR 1.01 12/23/2018     PTT:    Lab Results   Component Value Date    APTT 30.7 12/23/2018     UBALDO:  No results found for: ANATITER, UBALDO  CHEMISTRY COMMON GROUP :   Lab Results   Component Value Date    GLUCOSE 102 01/26/2020     Recent Labs     01/23/20  1407 01/24/20  0458 01/25/20  0515 01/26/20  0459   GLUCOSE 243* 193* 130* 102*   CALCIUM 9.4 9.1 7.8* 8.0*         RADIOLOGY:        Imaging Results. Chest X Ray reviwed by me    Chest Xray Reviewed by me  Renal Ultrasound Reviewed by me    EKG reviewed by me.                 Electronically Signed: Ji Gonzalez MD 1/26/2020 11:55 AM

## 2020-01-26 NOTE — PROGRESS NOTES
Patient called asking if he is receiving Keppra. Advised he is and I will have a dose for him with his night time meds. I then received a call from his wife who also asked if patient is receiving Keppra. Advised he is, wanted to know when it was started, advised 1700 1/24, this will be his 3rd dose tonight. She states the patient thinks he's going home tomorrow, advised there is no mention of discharge in doctor's note. She states she's going to bring him a change of clothes and asked if he would be able to shower. Advised that would require an order from the MD to say he could come off the heart monitor to shower. She wanted to know when he was put on a heart monitor. Advised he has been on the heart monitor since admission, she was not aware of that. Then daughter got on the phone and wanted to make sure he is receiving his antirejection medications properly. Tried to confirm them with her, but she didn't know the doses, states her dad does though and to double check with him.

## 2020-01-27 VITALS
OXYGEN SATURATION: 96 % | TEMPERATURE: 97.3 F | SYSTOLIC BLOOD PRESSURE: 146 MMHG | HEIGHT: 72 IN | BODY MASS INDEX: 25.11 KG/M2 | RESPIRATION RATE: 18 BRPM | WEIGHT: 185.4 LBS | HEART RATE: 65 BPM | DIASTOLIC BLOOD PRESSURE: 74 MMHG

## 2020-01-27 LAB
ALBUMIN SERPL-MCNC: 2.7 G/DL (ref 3.4–5)
ANION GAP SERPL CALCULATED.3IONS-SCNC: 8 MMOL/L (ref 3–16)
BUN BLDV-MCNC: 16 MG/DL (ref 7–20)
CALCIUM SERPL-MCNC: 7.6 MG/DL (ref 8.3–10.6)
CHLORIDE BLD-SCNC: 107 MMOL/L (ref 99–110)
CO2: 23 MMOL/L (ref 21–32)
CREAT SERPL-MCNC: 1.4 MG/DL (ref 0.8–1.3)
GFR AFRICAN AMERICAN: 60
GFR NON-AFRICAN AMERICAN: 50
GLUCOSE BLD-MCNC: 89 MG/DL (ref 70–99)
GLUCOSE BLD-MCNC: 94 MG/DL (ref 70–99)
GLUCOSE BLD-MCNC: 94 MG/DL (ref 70–99)
GLUCOSE BLD-MCNC: 98 MG/DL (ref 70–99)
PERFORMED ON: NORMAL
PHOSPHORUS: 2.8 MG/DL (ref 2.5–4.9)
POTASSIUM SERPL-SCNC: 3.7 MMOL/L (ref 3.5–5.1)
SODIUM BLD-SCNC: 138 MMOL/L (ref 136–145)

## 2020-01-27 PROCEDURE — 6370000000 HC RX 637 (ALT 250 FOR IP): Performed by: INTERNAL MEDICINE

## 2020-01-27 PROCEDURE — 6370000000 HC RX 637 (ALT 250 FOR IP): Performed by: HOSPITALIST

## 2020-01-27 PROCEDURE — 36415 COLL VENOUS BLD VENIPUNCTURE: CPT

## 2020-01-27 PROCEDURE — 97116 GAIT TRAINING THERAPY: CPT

## 2020-01-27 PROCEDURE — 6370000000 HC RX 637 (ALT 250 FOR IP): Performed by: PSYCHIATRY & NEUROLOGY

## 2020-01-27 PROCEDURE — 80069 RENAL FUNCTION PANEL: CPT

## 2020-01-27 PROCEDURE — 2580000003 HC RX 258: Performed by: HOSPITALIST

## 2020-01-27 PROCEDURE — 2580000003 HC RX 258: Performed by: INTERNAL MEDICINE

## 2020-01-27 PROCEDURE — C9113 INJ PANTOPRAZOLE SODIUM, VIA: HCPCS | Performed by: HOSPITALIST

## 2020-01-27 PROCEDURE — 99232 SBSQ HOSP IP/OBS MODERATE 35: CPT | Performed by: PSYCHIATRY & NEUROLOGY

## 2020-01-27 PROCEDURE — 6360000002 HC RX W HCPCS: Performed by: HOSPITALIST

## 2020-01-27 RX ORDER — LEVETIRACETAM 500 MG/1
500 TABLET ORAL 2 TIMES DAILY
Qty: 60 TABLET | Refills: 3 | Status: SHIPPED | OUTPATIENT
Start: 2020-01-27

## 2020-01-27 RX ADMIN — AMLODIPINE BESYLATE 10 MG: 5 TABLET ORAL at 08:59

## 2020-01-27 RX ADMIN — Medication 10 ML: at 09:01

## 2020-01-27 RX ADMIN — CLOPIDOGREL 75 MG: 75 TABLET, FILM COATED ORAL at 08:59

## 2020-01-27 RX ADMIN — ASPIRIN 81 MG 81 MG: 81 TABLET ORAL at 08:59

## 2020-01-27 RX ADMIN — LEVETIRACETAM 500 MG: 500 TABLET ORAL at 08:59

## 2020-01-27 RX ADMIN — TACROLIMUS 2 MG: 1 CAPSULE ORAL at 09:00

## 2020-01-27 RX ADMIN — INSULIN LISPRO 5 UNITS: 100 INJECTION, SOLUTION INTRAVENOUS; SUBCUTANEOUS at 09:05

## 2020-01-27 RX ADMIN — INSULIN LISPRO 5 UNITS: 100 INJECTION, SOLUTION INTRAVENOUS; SUBCUTANEOUS at 12:37

## 2020-01-27 RX ADMIN — LOSARTAN POTASSIUM 100 MG: 100 TABLET, FILM COATED ORAL at 08:59

## 2020-01-27 RX ADMIN — ENOXAPARIN SODIUM 40 MG: 40 INJECTION SUBCUTANEOUS at 08:58

## 2020-01-27 RX ADMIN — MYCOPHENOLATE MOFETIL 750 MG: 250 CAPSULE ORAL at 08:59

## 2020-01-27 RX ADMIN — PANTOPRAZOLE SODIUM 40 MG: 40 INJECTION, POWDER, LYOPHILIZED, FOR SOLUTION INTRAVENOUS at 08:59

## 2020-01-27 RX ADMIN — SODIUM CHLORIDE: 4.5 INJECTION, SOLUTION INTRAVENOUS at 00:06

## 2020-01-27 ASSESSMENT — PAIN SCALES - GENERAL
PAINLEVEL_OUTOF10: 0

## 2020-01-27 NOTE — PROGRESS NOTES
Bed alarm sounding, patient up walking around bed, confused, thought it was morning, thinks he is in the wrong room. Reoriented, assisted to use urinal, back to bed. Reminded to call for assistance before getting up, denies other needs @present.

## 2020-01-27 NOTE — PROGRESS NOTES
Bed alarm sounding, up wandering in room. Directed to BR, voided, back to bed. Rather aimless, very unsteady when up.

## 2020-01-27 NOTE — PROGRESS NOTES
Physical Therapy  Facility/Department: [address]  Daily Treatment Note  NAME: Louisa Lu  : 1945  MRN: [id number]    Date of Service: 2020    Discharge Recommendations:  Louisa Lu scored a 22/24 on the AM-PAC short mobility form. Current research shows that an AM-PAC score of 17 or less is typically not associated with a discharge to the patient's home setting. Based on the patients AM-PAC score and their current functional mobility deficits, it is recommended that the patient have 5-7 sessions per week of Physical Therapy at d/c to increase the patients independence. HOME HEALTH CARE: LEVEL 3 SAFETY     - Initial home health evaluation to occur within 24-48 hours, in patient home   - Therapy evaluations in home within 24-48 hours of discharge; including DME and home safety   - Frontload therapy 5 days, then 3x a week   - Therapy to evaluate if patient has [address] needs for personal care   -  evaluation within 24-48 hours, includes evaluation of resources and insurance to determine AL, [place], LTC, and Medicaid options         PT Equipment Recommendations  Equipment Needed: Yes  Mobility Devices: Alyne Rideau: Rolling    Assessment   Body structures, Functions, Activity limitations: Decreased functional mobility ; Decreased balance;Decreased coordination;Decreased endurance;Decreased strength  Assessment: Pt with decreased higher level balance with decreased insight to deficits.   Recommend 24 hour supervision and assist for safety  Treatment Diagnosis: balance deficits, gait impairment  Prognosis: Good  PT Education: Goals;PT Role;Plan of Care;Family Education;Precautions;General Safety;Transfer Training;Gait Training;Functional Mobility Training  Patient Education: Instructed patient only be up with staff assist. : decreased balance/safety concerns  Barriers to Learning: cognition  REQUIRES PT FOLLOW UP: Yes  Activity Tolerance  Activity Tolerance:

## 2020-01-27 NOTE — PROGRESS NOTES
MD Katherine Kay MD Jearline Hurl, MD                                  Office: [phone]                 Fax: [phone]          [facility]                     NEPHROLOGY INPATIENT PROGRESS NOTE:     PATIENT NAME: Vianney Gilman  : 1945  MRN: [id number]            Subjective:   Doing better. No complaints  Asking when he can go home    Assessment and Plan   Acute kidney injury-improved. Non-oliguric. Uncontrolled hypertension. Better. Was on ARB as outpt. Possible TIA. MRI negative  S/P Kidney transplant-living related-daughter-. Renal function around baseline. Follows with [facility]. Continue MMF 750mg bid and Tacro 2g bid. Chronic kidney disease in the transplant kidney with a baseline creatinine of 1.3. Renal carcinoma in the transplant kidney 2019 with partial nephrectomy. Stable from renal perspective. EXAM  Vitals:    20 0845   BP: (!) 153/77   Pulse: 60   Resp: 18   Temp: 97.6 °F (36.4 °C)   SpO2: 98%       Intake/Output Summary (Last 24 hours) at 2020 1052  Last data filed at 2020 1021  Gross per 24 hour   Intake 2513.8 ml   Output 1200 ml   Net 1313.8 ml         External exam of the ears and nose are normal  HENT: exam is normal  Eyes: Pupils are equal, round,  CVS.  Heart sounds are normal. No murmurs. No pericardial rub.  RS.clear  PA soft , bowel sounds are normal no distension and no tenderness to palpation. Skin No rash , No palpable nodules  Musculoskeletal: Normal range of motion. No edema and no tenderness. CNS  No focal deficits    Active Problems:    Expressive aphasia    Aphasia    Partial seizure ([address]  Resolved Problems:    * No resolved hospital problems.  *        Medications Reviewed by me   losartan  100 mg Oral Daily    tacrolimus  2 mg Oral BID    amLODIPine  10 mg Oral Daily    levETIRAcetam  500 mg Oral BID    aspirin  81 mg Oral Daily    atorvastatin  40 mg Oral Nightly    01/27/20  0458   GLUCOSE 130* 102* 98   CALCIUM 7.8* 8.0* 7.6*     Tacro 1/25/2020-9.2    RADIOLOGY:        Imaging Results.   MRI Head- no acute CVA      Electronically Signed: Veronika Mccoy MD 1/27/2020 10:52 AM

## 2020-01-27 NOTE — PLAN OF CARE
Telemetry removed for discharge- right forearm iv site removed- no bleeding or discoloration at site- script for keppra given to wife- wants to fill at their own pharmacy - reviewed discharge instructions- discharge mediations- possible side effects of keppra - reviewed need for f/u - declining home health care- declining any needs for home- at 1530 to lobby by wheelchair on room air with RN as transporter- all belongings in hand- wife driving to home

## 2020-01-27 NOTE — PLAN OF CARE
Problem: Pain:  Goal: Pain level will decrease  Description  Pain level will decrease  1/27/2020 0657 by Sebastián Zamora RN  Outcome: Ongoing  Note:   Denies pain this shift. Problem: Falls - Risk of:  Goal: Will remain free from falls  Description  Will remain free from falls  1/27/2020 0657 by Sebastián Zamora RN  Outcome: Ongoing  Note:   Remains free from falls this shift. Problem: HEMODYNAMIC STATUS  Goal: Patient has stable vital signs and fluid balance  1/27/2020 0657 by Sebastián Zamora RN  Outcome: Ongoing  Note:   SBP @150 this shift, no PRN meds required. Problem: ACTIVITY INTOLERANCE/IMPAIRED MOBILITY  Goal: Mobility/activity is maintained at optimum level for patient  1/27/2020 3640 by Sebastián Zamora RN  Outcome: Ongoing  Note:   Gait is very unsteady this shift. Problem: COMMUNICATION IMPAIRMENT  Goal: Ability to express needs and understand communication  1/27/2020 0657 by Sebastián Zamora RN  Outcome: Ongoing  Note:   No verbal impairment noted this shift, but much more confused.

## 2020-01-27 NOTE — PLAN OF CARE
Ambulating room without walker -appears with steady gait - still with iv fluids - wife at bedside - both asking if fall precautions can be removed- planning for discharge home today - rescore of fall risk now 20-- fall precautions removed- wife at bedside for assistance

## 2020-01-27 NOTE — DISCHARGE INSTR - COC
Continuity of Care Form    Patient Name: Tita Hill   :  1945  MRN:  [id number]    Admit date:  2020  Discharge date:  2020      Code Status Order: Full Code   Advance Directives:   [facility] Documentation     Date/Time Healthcare Directive Type of Healthcare Directive Copy in [address] Agent's Name Healthcare Agent's Phone Number    [phone]  Yes, patient has an advance directive for healthcare treatment  Durable power of  for health care;Living will  No, copy requested from other (See comment)  Healthcare power of   dipti - wife  --          Admitting Physician:  Charly Horne MD  PCP: No primary care provider on file. Discharging Nurse: [id number] [address] Unit/Room#: 7WM-6750/0683-34  Discharging Unit Phone Number: [phone]      Emergency Contact:   Extended Emergency Contact Information  Primary Emergency Contact: [facility]  Address: [address] [place] [address] Phone: [phone]  Mobile Phone: [phone]  Relation: Spouse  Secondary Emergency Contact: Master Mtz [address] Phone: [phone]  Mobile Phone: [phone]  Relation: Child    Past Surgical History:  Past Surgical History:   Procedure Laterality Date    EYE SURGERY      JOINT REPLACEMENT         Immunization History: There is no immunization history on file for this patient.     Active Problems:  Patient Active Problem List   Diagnosis Code    TIA (transient ischemic attack) G45.9    Expressive aphasia R47.01    Headache R51    Aphasia R47.01    Encephalopathy, hypertensive I67.4    HTN (hypertension), benign I10    DM (diabetes mellitus), secondary, uncontrolled, w/neurologic complic ([address] K60.42, P25.11    Dyslipidemia E78.5    Partial seizure ([address] R56.9       Isolation/Infection:   Isolation          No Isolation walker  Other Treatments: NONE    Patient's personal belongings (please select all that are sent with patient):  Glasses    RN SIGNATURE:  Electronically signed by Windy Mosher RN on 1/27/20 at 2:58 PM    CASE MANAGEMENT/SOCIAL WORK SECTION    Inpatient Status Date: ***    Readmission Risk Assessment Score:  Readmission Risk              Risk of Unplanned Readmission:        21           Discharging to Facility/ Agency   · Name:   · Address:  · Phone:  · Fax:    Dialysis Facility (if applicable)   · Name:  · Address:  · Dialysis Schedule:  · Phone:  · Fax:    / signature: {Esignature:[id number]}    PHYSICIAN SECTION    Prognosis: Good    Condition at Discharge: Stable    Rehab Potential (if transferring to Rehab): Good    Recommended Labs or Other Treatments After Discharge: PCP in 1 week  Neurology in 1 month  Driving restrictions per neurology     Physician Certification: I certify the above information and transfer of Curtis Lamb  is necessary for the continuing treatment of the diagnosis listed and that he requires [address] for less 30 days.      Update Admission H&P: No change in H&P    PHYSICIAN SIGNATURE:  Electronically signed by Christine Ferrell MD on 1/27/20 at 2:25 PM

## 2020-01-27 NOTE — PLAN OF CARE
Awake alert and oriented x 4- answering all questions appropriately -wife at bedside- ambulated hodge with PT this morning -'s - stable- no pain -lungs CTA- am meds given whole with water - per wife no longer takes 3rd transplant anti rejection drug Sirolimus -only taking 2 and received this am - call light in reach - bed alarm on

## 2020-01-27 NOTE — PROGRESS NOTES
Hospitalist Progress Note      PCP: No primary care provider on file. Date of Admission: 2020    SUBJECTIVE:   No new concerns; desires to go home              OBJECTIVE:     Vitals    TEMPERATURE:  Current - Temp: 97.1 °F (36.2 °C); Max - Temp  Av.1 °F (36.2 °C)  Min: 96.9 °F (36.1 °C)  Max: 97.4 °F (36.3 °C)  RESPIRATIONS RANGE: Resp  Av.3  Min: 16  Max: 18  PULSE RANGE: Pulse  Av.9  Min: 52  Max: 77  BLOOD PRESSURE RANGE:  Systolic (18KEB), XAI:040 , Min:136 , CJP:457   ; Diastolic (00PZK), RKF:23, Min:70, Max:85    PULSE OXIMETRY RANGE: SpO2  Av %  Min: 98 %  Max: 98 %  24HR INTAKE/OUTPUT:      Intake/Output Summary (Last 24 hours) at 2020 1930  Last data filed at 2020 1748  Gross per 24 hour   Intake 1528.4 ml   Output 2195 ml   Net -666.6 ml       Exam:    General appearance: Well, no apparent distress, appears stated age and cooperative. HEENT Normal cephalic, atraumatic without obvious deformity. Pupils equal, round, and reactive to light. Extra ocular muscles intact. Conjunctivae/corneas clear. Neck: Supple, No jugular venous distention/bruits. Trachea midline without thyromegaly or adenopathy with full range of motion. Lungs: Clear to ascultation, bilaterally without Rales/Wheezes/Rhonchi with good respiratory effort. Heart: Regular rate and rhythm with Normal S1/S2 without  murmurs, rubs or gallops, point of maximum impulse non-displaced  Abdomen: Soft, non-tender or non-distended without rigidity or guarding and positive bowel sounds all four quadrants. Extremities: No clubbing, cyanosis, or edema bilaterally. Full range of motion without deformity and normal gait intact. Skin: Skin color, texture, turgor normal. No rashes or lesions. Neurologic: Alert and oriented X 3,  neurovascularly intact with sensory/motor intact upper extremities/lower extremities, bilaterally.  Cranial nerves:II-XII intact, grossly non-focal.  Mental status: Alert, oriented,

## 2020-01-27 NOTE — PROGRESS NOTES
Bed alarm sounding, remains very confused, no awareness of IV. Directed to BR, looking behind shower curtain as if he can't see toilet. Assisted to sit on toilet, voided, returned to bed. VSS, encouraged to go back to sleep.

## 2020-01-27 NOTE — PROGRESS NOTES
NEUROLOGY FOLLOWUP    HISTORY OF PRESENT ILLNESS :     Watson Coreas is a 69 y.o. male   History was obtained from the patient and dictation to the chart. Patient speech seems to be improving and is almost back to baseline today. Patient was admitted with recurrent diffuse episodes of aphasia. Patient has had 4 such episodes in the last 18 months. Initially these were felt to be TIA/stroke but after the last episode seizure was considered. Patient was started on Keppra. EEG was normal but this was not interictal EEG. Patient has had previous kidney transplant.     REVIEW OF SYSTEMS       Constitutional:  []   Chills   [x]  Fatigue   []  Fevers   []  Malaise   []  Weight loss     [] Denies all of the above    Respiratory:   []  Cough    []  Shortness of breath         [x] Denies all of the above     Cardiovascular:   []  Chest pain    []  Exertional chest pressure/discomfort           [] Palpitations    []  Syncope     [x] Denies all of the above    Past Medical History:   Diagnosis Date    Arthritis     [facility]     Cancer ([facility]     Diabetes mellitus ([facility]     History of blood transfusion     Hyperlipidemia     Hypertension      Family History   Problem Relation Age of Onset    Arthritis Mother     Arrhythmia Mother     Diabetes Mother     High Blood Pressure Mother     Obesity Mother     High Blood Pressure Father     Obesity Father     Allergy (Severe) Child     Asthma Child     Obesity Child      Social History     Socioeconomic History    Marital status:      Spouse name: None    Number of children: None    Years of education: None    Highest education level: None   Occupational History    Occupation: retired   Social Needs    Financial resource strain: None    Food insecurity:     Worry: None     Inability: None    Transportation needs:     Medical: None

## 2020-01-27 NOTE — PLAN OF CARE
Vitals stable -anxious for discharge- alert and oriented x 4- appropriate with answers- wife at bedside- urine output pink tinged- denies pain - call light in reach -ambulating room with standby assist-call light in reach

## 2020-01-27 NOTE — DISCHARGE SUMMARY
cooperative. HEENT:  Normal cephalic, atraumatic without obvious deformity. Pupils equal, round, and reactive to light. Extra ocular muscles intact. Conjunctivae/corneas clear. Neck: Supple, with full range of motion. No jugular venous distention. Trachea midline. Respiratory:  Normal respiratory effort. Clear to auscultation, bilaterally without Rales/Wheezes/Rhonchi. Cardiovascular:  Regular rate and rhythm with normal S1/S2 without murmurs, rubs or gallops. Abdomen: Soft, non-tender, non-distended with normal bowel sounds. Musculoskeletal:  No clubbing, cyanosis or edema bilaterally. Full range of motion without deformity. Skin: Skin color, texture, turgor normal.  No rashes or lesions. Neurologic:  Neurovascularly intact without any focal sensory/motor deficits. Cranial nerves: II-XII intact, grossly non-focal.  Psychiatric:  Alert and oriented, thought content appropriate, normal insight  Capillary Refill: Brisk,< 3 seconds   Peripheral Pulses: +2 palpable, equal bilaterally       Labs: For convenience and continuity at follow-up the following most recent labs are provided:      CBC:    Lab Results   Component Value Date    WBC 11.7 01/24/2020    HGB 14.0 01/24/2020    HCT 41.8 01/24/2020     01/24/2020       Renal:    Lab Results   Component Value Date     01/27/2020    K 3.7 01/27/2020    K 4.3 01/23/2020     01/27/2020    CO2 23 01/27/2020    BUN 16 01/27/2020    CREATININE 1.4 01/27/2020    CALCIUM 7.6 01/27/2020    PHOS 2.8 01/27/2020         Significant Diagnostic Studies    Radiology:   MRI BRAIN WO CONTRAST MR WITNESS   Final Result   Limited code stroke scan. No acute infarct identified. XR CHEST PORTABLE   Final Result   No acute cardiopulmonary disease. CT HEAD WO CONTRAST   Final Result   No hemorrhage or mass identified      Atrophy and small-vessel ischemic change, similar to prior.       Mild paranasal sinus disease      Results discussed with

## 2020-01-28 NOTE — PROGRESS NOTES
Speech Language Pathology    Discharge  Name: Louisa Lu  : 1945  Medical Diagnosis: Aphasia [R47.01]  Aphasia [R47.01]  Aphasia [R47.01]  Treatment Diagnosis: Dysphagia    Speech Therapy/Dysphagia  Pt discharged to Home on 20 . Bedside Swallow Eval completed 20 (see report). No goals met prior to discharge. Recommend: Continued Dysphagia treatment at Home, with goals and treatment per Plan of Care. DIET LEVEL AT DISCHARGE:  1. Recommend continue regular texture diet/thin liquids; meds as tolerated  2.  Pt may benefit from GI consult to rule out esophageal dysphagia    GOALS ADDRESSED:  1.) Pt will tolerate recommended diet without s/s of aspiration (Goal not met)    Gracy MARIN-ESPERANZAN#[id number]

## 2020-01-30 NOTE — PROGRESS NOTES
Occupational Therapy  Occupational Therapy Discharge Summary    Name: Jessica Collins  : 1945    The pt was evaluated by OT on 2020 and seen for 0 treatment sessions prior to DC to home on 2020 per MD order. The pt's acute therapy goals were:  Short term goals  Short term goal 1: increase am pac score to 20  Short term goal 2: modified independent throughout bathing, dressing, toileting, grooming  Short term goal 3: modified independent maintaining balance and completing functional mobility to acheive self care goals        Patient met 0 goals during stay. Number of Refusals:2  Number of Holds: 1  During this hospitalization, the patient was educated on:  Patient Education: discharge recommendation and findings    DC pt from OT caseload at this time. Thank you!     Thomas Schmidt, OTR/L [id number]

## 2020-02-06 ENCOUNTER — FOLLOWUP TELEPHONE ENCOUNTER (OUTPATIENT)
Dept: INPATIENT UNIT | Age: 75
End: 2020-02-06

## 2020-12-05 NOTE — PROGRESS NOTES
Occupational Therapy   Occupational Therapy Initial Assessment/discharge summary  Date: 2018   Patient Name: Jean Marie Heck  MRN: [id number]     : 1945    Date of Service: 2018    Discharge Recommendations:Luis Fernando White scored a  on the -[facility] ADL Inpatient form. At this time, no further OT is recommended upon discharge due to patient at functional baseline. Recommend patient returns to prior setting with prior services. OT Equipment Recommendations  Equipment Needed: No      Patient Diagnosis(es): The primary encounter diagnosis was Altered mental status, unspecified altered mental status type. A diagnosis of Aphasia was also pertinent to this visit. has a past medical history of Arthritis; Atrial fibrillation ([facility] [address] Cancer ([facility] [address] Diabetes mellitus ([facility] [address] History of blood transfusion; Hyperlipidemia; and Hypertension. has a past surgical history that includes eye surgery and joint replacement.     Treatment Diagnosis: TIA      Restrictions  Restrictions/Precautions  Restrictions/Precautions: Fall Risk (High fall risk)    Subjective   General  Chart Reviewed: Yes  Family / Caregiver Present: Yes (Spouse and Daughter)  Diagnosis: TIA  Subjective  Subjective: Patient supine in bed, agreeable to evaluation  Pain Assessment  Patient Currently in Pain: Denies  Pain Assessment: Faces  Pain Level: 0  Pain Type: Acute pain  Pain Location: Head  Pain Orientation: Right  Pre Treatment Pain Screening  Intervention List: Patient able to continue with treatment  Social/Functional History  Social/Functional History  Lives With: Spouse  Type of Home: House  Home Layout: One level  Home Access: Stairs to enter without rails  Entrance Stairs - Number of Steps: 1  Bathroom Shower/Tub: Walk-in shower  Bathroom Toilet: Standard  Bathroom Accessibility: Accessible  Home Equipment: Rolling walker  ADL Assistance: Independent  Homemaking Assistance: Independent  Ambulation Assistance: Negative